# Patient Record
Sex: MALE | Race: WHITE | Employment: OTHER | URBAN - METROPOLITAN AREA
[De-identification: names, ages, dates, MRNs, and addresses within clinical notes are randomized per-mention and may not be internally consistent; named-entity substitution may affect disease eponyms.]

---

## 2017-02-17 ENCOUNTER — HOSPITAL ENCOUNTER (OUTPATIENT)
Dept: LAB | Age: 72
Discharge: HOME | End: 2017-02-17
Attending: INTERNAL MEDICINE
Payer: MEDICARE

## 2017-02-17 DIAGNOSIS — E11.9 TYPE 2 DIABETES MELLITUS WITHOUT COMPLICATION, WITHOUT LONG-TERM CURRENT USE OF INSULIN (HCC): ICD-10-CM

## 2017-02-17 LAB
ANION GAP BLD CALC-SCNC: 12 MMOL/L
BASOPHILS # BLD AUTO: 0 K/UL (ref 0–0.2)
BASOPHILS # BLD: 0 % (ref 0–2)
BUN SERPL-MCNC: 20 MG/DL (ref 8–23)
CALCIUM SERPL-MCNC: 9.4 MG/DL (ref 8.3–10.4)
CHLORIDE SERPL-SCNC: 105 MMOL/L (ref 98–107)
CO2 SERPL-SCNC: 24 MMOL/L (ref 23–32)
CREAT SERPL-MCNC: 1 MG/DL (ref 0.6–1)
DIFFERENTIAL METHOD BLD: ABNORMAL
EOSINOPHIL # BLD: 0.3 K/UL (ref 0–0.8)
EOSINOPHIL NFR BLD: 2 % (ref 0.5–7.8)
ERYTHROCYTE [DISTWIDTH] IN BLOOD BY AUTOMATED COUNT: 12.7 % (ref 11.9–14.6)
GLUCOSE SERPL-MCNC: 193 MG/DL (ref 65–100)
HCT VFR BLD AUTO: 43.9 % (ref 41.1–50.3)
HGB BLD-MCNC: 14.6 G/DL (ref 13.6–17.2)
LYMPHOCYTES # BLD AUTO: 13 % (ref 13–44)
LYMPHOCYTES # BLD: 1.7 K/UL (ref 0.5–4.6)
MCH RBC QN AUTO: 29.9 PG (ref 26.1–32.9)
MCHC RBC AUTO-ENTMCNC: 33.3 G/DL (ref 31.4–35)
MCV RBC AUTO: 90 FL (ref 79.6–97.8)
MONOCYTES # BLD: 1.4 K/UL (ref 0.1–1.3)
MONOCYTES NFR BLD AUTO: 11 % (ref 4–12)
NEUTS SEG # BLD: 9.3 K/UL (ref 1.7–8.2)
NEUTS SEG NFR BLD AUTO: 74 % (ref 43–78)
PLATELET # BLD AUTO: 212 K/UL (ref 150–450)
PMV BLD AUTO: 11 FL (ref 10.8–14.1)
POTASSIUM SERPL-SCNC: 4.2 MMOL/L (ref 3.5–5.1)
RBC # BLD AUTO: 4.88 M/UL (ref 4.23–5.67)
SODIUM SERPL-SCNC: 141 MMOL/L (ref 136–145)
WBC # BLD AUTO: 12.7 K/UL (ref 4.3–11.1)

## 2017-02-17 PROCEDURE — 36415 COLL VENOUS BLD VENIPUNCTURE: CPT | Performed by: INTERNAL MEDICINE

## 2017-02-17 PROCEDURE — 82010 KETONE BODYS QUAN: CPT | Performed by: INTERNAL MEDICINE

## 2017-02-17 PROCEDURE — 85025 COMPLETE CBC W/AUTO DIFF WBC: CPT | Performed by: INTERNAL MEDICINE

## 2017-02-17 PROCEDURE — 80048 BASIC METABOLIC PNL TOTAL CA: CPT | Performed by: INTERNAL MEDICINE

## 2017-02-17 NOTE — PROGRESS NOTES
Tell lytes were ok --the wbc was up very slightly c/w his illness-sugar was 193 hi from illness also

## 2017-02-20 LAB — B-OH-BUTYR SERPL-MCNC: 9.8 MG/DL

## 2017-02-23 ENCOUNTER — HOSPITAL ENCOUNTER (OUTPATIENT)
Dept: CT IMAGING | Age: 72
Discharge: HOME | End: 2017-02-23
Attending: INTERNAL MEDICINE
Payer: MEDICARE

## 2017-02-23 DIAGNOSIS — R10.84 GENERALIZED ABDOMINAL PAIN: ICD-10-CM

## 2017-02-23 PROCEDURE — 74011000258 HC RX REV CODE- 258: Performed by: INTERNAL MEDICINE

## 2017-02-23 PROCEDURE — 74177 CT ABD & PELVIS W/CONTRAST: CPT

## 2017-02-23 PROCEDURE — 74011636320 HC RX REV CODE- 636/320: Performed by: INTERNAL MEDICINE

## 2017-02-23 RX ORDER — SODIUM CHLORIDE 0.9 % (FLUSH) 0.9 %
10 SYRINGE (ML) INJECTION
Status: COMPLETED | OUTPATIENT
Start: 2017-02-23 | End: 2017-02-23

## 2017-02-23 RX ADMIN — SODIUM CHLORIDE 100 ML: 900 INJECTION, SOLUTION INTRAVENOUS at 12:39

## 2017-02-23 RX ADMIN — IOVERSOL 100 ML: 741 INJECTION INTRA-ARTERIAL; INTRAVENOUS at 12:39

## 2017-02-23 RX ADMIN — DIATRIZOATE MEGLUMINE AND DIATRIZOATE SODIUM 15 ML: 660; 100 LIQUID ORAL; RECTAL at 12:39

## 2017-02-23 RX ADMIN — Medication 10 ML: at 12:39

## 2017-02-23 NOTE — PROGRESS NOTES
Tell the CT looks ok in pancreas and the colon --there is some inflammation in the stomach like a gastritis--he may need to have endoscopy done for this though-he is on prilosec and would increase to bid dose for now--the labs are pending-who is his GI ?

## 2017-04-10 ENCOUNTER — HOSPITAL ENCOUNTER (OUTPATIENT)
Dept: LAB | Age: 72
Discharge: HOME | End: 2017-04-10

## 2017-04-10 PROCEDURE — 88305 TISSUE EXAM BY PATHOLOGIST: CPT | Performed by: INTERNAL MEDICINE

## 2017-04-10 PROCEDURE — 88312 SPECIAL STAINS GROUP 1: CPT | Performed by: INTERNAL MEDICINE

## 2017-05-09 PROBLEM — R39.11 DELAYED ONSET OF URINATION: Status: ACTIVE | Noted: 2017-04-25

## 2017-05-09 PROBLEM — N20.0 CALCULUS OF KIDNEY: Status: ACTIVE | Noted: 2017-01-17

## 2017-05-09 PROBLEM — R39.89 BLADDER PAIN: Status: ACTIVE | Noted: 2017-04-25

## 2017-05-09 PROBLEM — N48.29: Status: ACTIVE | Noted: 2017-01-17

## 2017-06-15 PROBLEM — R63.4 UNINTENDED WEIGHT LOSS: Status: ACTIVE | Noted: 2017-05-25

## 2017-06-15 PROBLEM — K40.90 LEFT INGUINAL HERNIA: Status: ACTIVE | Noted: 2017-05-25

## 2017-06-15 PROBLEM — R30.0 DIFFICULT OR PAINFUL URINATION: Status: ACTIVE | Noted: 2017-05-25

## 2017-06-15 PROBLEM — R10.32 LEFT LOWER QUADRANT PAIN: Status: ACTIVE | Noted: 2017-05-25

## 2017-06-15 PROBLEM — N32.0 BLADDER NECK OBSTRUCTION: Status: ACTIVE | Noted: 2017-05-25

## 2017-07-26 ENCOUNTER — HOSPITAL ENCOUNTER (OUTPATIENT)
Dept: CT IMAGING | Age: 72
Discharge: HOME | End: 2017-07-26
Attending: INTERNAL MEDICINE
Payer: MEDICARE

## 2017-07-26 VITALS — HEIGHT: 68 IN | WEIGHT: 170 LBS | BODY MASS INDEX: 25.76 KG/M2

## 2017-07-26 DIAGNOSIS — R10.84 GENERALIZED ABDOMINAL PAIN: ICD-10-CM

## 2017-07-26 DIAGNOSIS — R63.4 WEIGHT LOSS: ICD-10-CM

## 2017-07-26 DIAGNOSIS — R93.3 ABNORMAL COMPUTED TOMOGRAPHY OF GASTROINTESTINAL TRACT: ICD-10-CM

## 2017-07-26 LAB — CREAT BLD-MCNC: 0.8 MG/DL (ref 0.6–1)

## 2017-07-26 PROCEDURE — 74011636320 HC RX REV CODE- 636/320: Performed by: INTERNAL MEDICINE

## 2017-07-26 PROCEDURE — 74011000258 HC RX REV CODE- 258: Performed by: INTERNAL MEDICINE

## 2017-07-26 PROCEDURE — 74177 CT ABD & PELVIS W/CONTRAST: CPT

## 2017-07-26 PROCEDURE — 82565 ASSAY OF CREATININE: CPT

## 2017-07-26 RX ORDER — SODIUM CHLORIDE 0.9 % (FLUSH) 0.9 %
10 SYRINGE (ML) INJECTION
Status: COMPLETED | OUTPATIENT
Start: 2017-07-26 | End: 2017-07-26

## 2017-07-26 RX ADMIN — DIATRIZOATE MEGLUMINE AND DIATRIZOATE SODIUM 15 ML: 660; 100 LIQUID ORAL; RECTAL at 13:15

## 2017-07-26 RX ADMIN — Medication 10 ML: at 13:16

## 2017-07-26 RX ADMIN — SODIUM CHLORIDE 100 ML: 900 INJECTION, SOLUTION INTRAVENOUS at 13:16

## 2017-07-26 RX ADMIN — IOPAMIDOL 100 ML: 755 INJECTION, SOLUTION INTRAVENOUS at 13:15

## 2017-08-23 ENCOUNTER — HOSPITAL ENCOUNTER (OUTPATIENT)
Age: 72
Setting detail: OUTPATIENT SURGERY
Discharge: HOME | End: 2017-08-23
Attending: INTERNAL MEDICINE | Admitting: INTERNAL MEDICINE
Payer: MEDICARE

## 2017-08-23 ENCOUNTER — ANESTHESIA EVENT (OUTPATIENT)
Dept: ENDOSCOPY | Age: 72
End: 2017-08-23
Payer: MEDICARE

## 2017-08-23 ENCOUNTER — ANESTHESIA (OUTPATIENT)
Dept: ENDOSCOPY | Age: 72
End: 2017-08-23
Payer: MEDICARE

## 2017-08-23 VITALS
RESPIRATION RATE: 14 BRPM | WEIGHT: 167.99 LBS | SYSTOLIC BLOOD PRESSURE: 149 MMHG | HEIGHT: 68 IN | BODY MASS INDEX: 25.46 KG/M2 | HEART RATE: 86 BPM | DIASTOLIC BLOOD PRESSURE: 77 MMHG | TEMPERATURE: 98.6 F | OXYGEN SATURATION: 96 %

## 2017-08-23 LAB — GLUCOSE BLD STRIP.AUTO-MCNC: 109 MG/DL (ref 65–100)

## 2017-08-23 PROCEDURE — 88172 CYTP DX EVAL FNA 1ST EA SITE: CPT | Performed by: INTERNAL MEDICINE

## 2017-08-23 PROCEDURE — 76040000026: Performed by: INTERNAL MEDICINE

## 2017-08-23 PROCEDURE — 77030028690 HC NDL ASPIR ULTRSND BSC -E: Performed by: INTERNAL MEDICINE

## 2017-08-23 PROCEDURE — 88177 CYTP FNA EVAL EA ADDL: CPT | Performed by: INTERNAL MEDICINE

## 2017-08-23 PROCEDURE — 82962 GLUCOSE BLOOD TEST: CPT

## 2017-08-23 PROCEDURE — 74011250636 HC RX REV CODE- 250/636

## 2017-08-23 PROCEDURE — 74011000250 HC RX REV CODE- 250: Performed by: ANESTHESIOLOGY

## 2017-08-23 PROCEDURE — 76060000033 HC ANESTHESIA 1 TO 1.5 HR: Performed by: INTERNAL MEDICINE

## 2017-08-23 PROCEDURE — 74011000250 HC RX REV CODE- 250

## 2017-08-23 PROCEDURE — 77030008969: Performed by: INTERNAL MEDICINE

## 2017-08-23 PROCEDURE — 74011250636 HC RX REV CODE- 250/636: Performed by: ANESTHESIOLOGY

## 2017-08-23 PROCEDURE — 88173 CYTOPATH EVAL FNA REPORT: CPT | Performed by: INTERNAL MEDICINE

## 2017-08-23 PROCEDURE — 88305 TISSUE EXAM BY PATHOLOGIST: CPT | Performed by: INTERNAL MEDICINE

## 2017-08-23 RX ORDER — LIDOCAINE HYDROCHLORIDE 20 MG/ML
INJECTION, SOLUTION EPIDURAL; INFILTRATION; INTRACAUDAL; PERINEURAL AS NEEDED
Status: DISCONTINUED | OUTPATIENT
Start: 2017-08-23 | End: 2017-08-23 | Stop reason: HOSPADM

## 2017-08-23 RX ORDER — SODIUM CHLORIDE, SODIUM LACTATE, POTASSIUM CHLORIDE, CALCIUM CHLORIDE 600; 310; 30; 20 MG/100ML; MG/100ML; MG/100ML; MG/100ML
100 INJECTION, SOLUTION INTRAVENOUS CONTINUOUS
Status: CANCELLED | OUTPATIENT
Start: 2017-08-23

## 2017-08-23 RX ORDER — PROPOFOL 10 MG/ML
INJECTION, EMULSION INTRAVENOUS AS NEEDED
Status: DISCONTINUED | OUTPATIENT
Start: 2017-08-23 | End: 2017-08-23 | Stop reason: HOSPADM

## 2017-08-23 RX ORDER — SODIUM CHLORIDE, SODIUM LACTATE, POTASSIUM CHLORIDE, CALCIUM CHLORIDE 600; 310; 30; 20 MG/100ML; MG/100ML; MG/100ML; MG/100ML
100 INJECTION, SOLUTION INTRAVENOUS CONTINUOUS
Status: DISCONTINUED | OUTPATIENT
Start: 2017-08-23 | End: 2017-08-23 | Stop reason: HOSPADM

## 2017-08-23 RX ORDER — PROPOFOL 10 MG/ML
INJECTION, EMULSION INTRAVENOUS
Status: DISCONTINUED | OUTPATIENT
Start: 2017-08-23 | End: 2017-08-23 | Stop reason: HOSPADM

## 2017-08-23 RX ORDER — SODIUM CHLORIDE 0.9 % (FLUSH) 0.9 %
5-10 SYRINGE (ML) INJECTION AS NEEDED
Status: CANCELLED | OUTPATIENT
Start: 2017-08-23

## 2017-08-23 RX ADMIN — PROPOFOL 100 MG: 10 INJECTION, EMULSION INTRAVENOUS at 13:58

## 2017-08-23 RX ADMIN — FAMOTIDINE 20 MG: 10 INJECTION, SOLUTION INTRAVENOUS at 12:27

## 2017-08-23 RX ADMIN — SODIUM CHLORIDE, SODIUM LACTATE, POTASSIUM CHLORIDE, AND CALCIUM CHLORIDE: 600; 310; 30; 20 INJECTION, SOLUTION INTRAVENOUS at 13:55

## 2017-08-23 RX ADMIN — PROPOFOL 250 MCG/KG/MIN: 10 INJECTION, EMULSION INTRAVENOUS at 13:58

## 2017-08-23 RX ADMIN — LIDOCAINE HYDROCHLORIDE 60 MG: 20 INJECTION, SOLUTION EPIDURAL; INFILTRATION; INTRACAUDAL; PERINEURAL at 13:58

## 2017-08-23 NOTE — OP NOTES
Gastroenterology Procedure Note              Procedure:  Endoscopic ultrasound with Doppler and Fine needle aspiration    Date of Procedure:  8/23/2017    Patient:  Rachael Dalton     [birth year]    Indication:  Pancreatic mass    Sedation:  MAC    Pre-Procedure Physical Exam:    Mental status:  alert and oriented  Airway:  normal oropharyngeal airway and neck mobility  CV:  regular rate and rhythm  Respiratory:  clear to auscultation    Procedure:  A History and Physical has been performed, and patient medication allergies have been  reviewed. Risks of perforation, hemorrhage, adverse drug reaction, and aspiration were discussed. Informed consent was obtained for the procedure, including sedation. The patient was placed in the left lateral decubitus position. The heart rate, oxygen saturations, blood pressure, and response to care were monitored throughout the procedure. The linear echoendoscope was passed through the mouth and advanced under direct vision to the distal duodenum. As the scope was slowly withdrawn, detailed endoscopic images were obtained from the surrounding organs. FNA was performed using a transduodenal approach. The patient tolerated the procedure well. Findings:     ENDOSCOPIC FINDINGS:  Limited views of the mucosa with the echoendoscope reveals no gross abnormalities of the stomach. There is extrinsic compression of the anterior wall of the duodenal bulb. The ampulla is well-visualized endoscopically and appears normal.    PANCREAS:  The pancreas is well-visualized from head to tail. There is parenchymal of the body and tail. In the head of the pancreas, there is a 24 x 21 mm, hypoechoic, heterogenous mass. This is pentrating through the muscularis propria of the duodenal wall. The mass compresses the pancreatic duct and abuts the wall of the portal vein at the confluence.  The pancreatic duct is dilated upstream to 5 mm maximally within the neck of the pancreas and measures 2 mm within the tail of the pancreas. There is normal flow within the portal vein and superior mesenteric vein. The superior mesenteric artery and celiac artery are normal. Fine needle aspiration was performed using a 22-gauge [employer] needle. Five passes were made and sent for cytology. BILIARY TREE: The gallbladder contains a single mobile echogenic stone. The common bile duct is well-visualized from its insertion in the ampulla to the bifurcation of right and left hepatic ducts. It is non-dilated, measuring up to 7 mm maximally with a gradual taper down to the level of the ampulla. There are no intraductal stones, sludge or debris. The ampulla appears normal endosonographically. OTHER ORGANS:  Views of the left lobe of the liver demonstrate no solid mass lesions. There is no ascites in the upper abdomen. The left adrenal gland appears normal. There are no pathologically enlarged posterior mediastinal or upper abdominal lymph nodes. Specimen:  Yes    Estimated Blood Loss:  3 ml    Implant:  None           Impression:    1. Pancreatic mass. Preliminary cytology is suspicious for malignancy. This is locally advanced disease. 2. Cholelithiasis. Plan:  1. Follow up results of cytology. 2. Avoid NSAIDs for 48 hours. 3. Follow up with Dr. Akosua Agudelo as scheduled.     Signed:  Hilario Mota MD  8/23/2017  2:49 PM

## 2017-08-23 NOTE — ROUTINE PROCESS
Vss. No complaints noted. Education given and reviewed with pt who was awake and alert. Pt wheeled out via wheelchair.

## 2017-08-23 NOTE — H&P
History and Physical for Endoscopic Ultrasound             Date: 8/23/2017     History of Present Illness:  Patient presents to undergo endoscopic ultrasound for evaluation of a pancreatic mass. Patient denies any dysphagia, diarrhea, constipation, tenesmus, or GI bleeding. Past Medical History:   Diagnosis Date    Abdominal pain, unspecified site     Abnormal CXR 2006    & CT; tiny left mid lung nodule and similar nodule on the right    Acute bronchitis     Allergic rhinitis 10/15/2013    Benign skin growth of ear     Cervicalgia     Chest wall pain, chronic     right sided    Diverticular disease     Essential hypertension, benign     Fasciitis, unspecified     GERD (gastroesophageal reflux disease) 10/15/2013    Hip pain     IFG (impaired fasting glucose)     Impacted cerumen     Intrinsic asthma, unspecified 10/15/2013    Kidney stone     Lumbago     Lung nodule 10/15/2013    Stable left mid lung nodule since 2006.   Felt to represent granuloma     Mixed hyperlipidemia     Neoplasm of uncertain behavior of skin     Nephrolithiasis     Obesity 10/15/2013    Osteoarthrosis, unspecified whether generalized or localized, ankle and foot     Other ill-defined conditions 8/2011    cardiac workup; normal per patient report    Personal history of colonic polyps     Personal history of tobacco use, presenting hazards to health 10/15/2013    Prostate CA ([facility] [address]     12/2008 Radical prostatectomy    Renal cyst     Sciatica     Temporomandibular joint articular disc disorder     Type II or unspecified type diabetes mellitus without mention of complication, not stated as uncontrolled     checks QAM normal 115-130, no hyposymptoms    Unspecified asthma      Past Surgical History:   Procedure Laterality Date    HX CATARACT REMOVAL Bilateral 8/2014    w IOL    HX CATARACT REMOVAL Bilateral 04/01/2016    unsure of exact date    HX COLONOSCOPY      HX FRACTURE TX      right ankle and leg    HX GI      HX HERNIA REPAIR      HX RADICAL PROSTATECTOMY  12/2008    HX UROLOGICAL      bladder stricture       Family History   Problem Relation Age of Onset    Cancer Father      lung    Stroke Mother     Diabetes Mother      Type 2    Diabetes Other     Stroke Other      Social History   Substance Use Topics    Smoking status: Former Smoker     Packs/day: 1.00     Years: 40.00     Quit date: 1/1/1992    Smokeless tobacco: Never Used    Alcohol use 1.8 oz/week     2 Glasses of wine, 1 Cans of beer, 0 Shots of liquor per week        Allergies   Allergen Reactions    Latex, Natural Rubber Rash    Jardiance [Empagliflozin] Other (comments)     Causes stomach and bladder issues    Pcn [Penicillins] Rash    Sulfa (Sulfonamide Antibiotics) Rash     Current Facility-Administered Medications   Medication Dose Route Frequency    lactated Ringers infusion  100 mL/hr IntraVENous CONTINUOUS        Review of Systems:  A detailed 10 organ review of systems is obtained with pertinent positives as listed in the History of Present Illness. All others are negative. Objective:     Physical Exam:  Visit Vitals    /75    Pulse 68    Temp 98.2 °F (36.8 °C)    Resp 16    Ht 5' 8\" (1.727 m)    Wt 76.2 kg (167 lb 15.9 oz)    SpO2 96%    BMI 25.54 kg/m2      General:  Alert and oriented. Heart: Regular rate and rhythm  Lungs:  Clear to auscultation bilaterally  Abdomen: Soft, nontender, nondistended    Impression/Plan:     Proceed with EUS as planned. I have discussed with the patient the technique, benefits, alternatives, and risks of the procedure, including medication reaction, immediate or delayed bleeding, or perforation of the gastrointestinal tract.     Signed By: Del Varghese MD     August 23, 2017

## 2017-08-23 NOTE — ANESTHESIA POSTPROCEDURE EVALUATION
Post-Anesthesia Evaluation and Assessment    Patient: Charity Lancaster MRN: [id number]  SSN: [id number]    YOB: 1945  Age: 71 y.o. Sex: male       Cardiovascular Function/Vital Signs  Visit Vitals    /68    Pulse 77    Temp 37 °C (98.6 °F)    Resp 14    Ht 5' 8\" (1.727 m)    Wt 76.2 kg (167 lb 15.9 oz)    SpO2 97%    BMI 25.54 kg/m2       Patient is status post total IV anesthesia anesthesia for Procedure(s):  ENDOSCOPIC ULTRASOUND (EUS) / BMI=26  ENDOSCOPIC ULTRASOUND ASPIRATION FINE NEEDLE. Nausea/Vomiting: None    Postoperative hydration reviewed and adequate. Pain:  Pain Scale 1: Visual (08/23/17 1456)  Pain Intensity 1: 0 (08/23/17 1456)   Managed    Neurological Status: At baseline    Mental Status and Level of Consciousness: Arousable    Pulmonary Status:   O2 Device: Nasal cannula (08/23/17 1456)   Adequate oxygenation and airway patent    Complications related to anesthesia: None    Post-anesthesia assessment completed.  No concerns    Signed By: Moises Fleming MD     August 23, 2017

## 2017-08-23 NOTE — IP AVS SNAPSHOT
[address] 
[phone] Patient: Judith Clifford MRN: [id number] [facility]:[birth year] You are allergic to the following Allergen Reactions Latex, Natural Rubber Rash  
    
 Jardiance (Empagliflozin) Other (comments) Causes stomach and bladder issues Pcn (Penicillins) Rash  
    
 Sulfa (Sulfonamide Antibiotics) Rash Recent Documentation Height Weight BMI Smoking Status 1.727 m 76.2 kg 25.54 kg/m2 Former Smoker Emergency Contacts Name Discharge Info Relation Home Work Mobile Adithyafreda Guerra  Child [2] [phone] About your hospitalization You were admitted on:  August 23, 2017 You last received care in the:  [facility] You were discharged on:  August 23, 2017 Unit phone number:  [phone] Why you were hospitalized Your primary diagnosis was:  Not on File Providers Seen During Your Hospitalizations Provider Role Specialty Primary office phone Luis Antonio Villegas MD Attending Provider Gastroenterology [phone] Your Primary Care Physician (PCP) Primary Care Physician Office Phone Office Fax Larisa Benson [phone] Follow-up Information None Your Appointments Tuesday September 19, 2017  8:20 AM EDT Follow Up with Hao Amador MD  
[facility]) [address] [place] [address] [place] [id number] [phone] Current Discharge Medication List  
  
ASK your doctor about these medications Dose & Instructions Dispensing Information Comments Morning Noon Evening Bedtime ADVAIR DISKUS 250-50 mcg/dose diskus inhaler Generic drug:  fluticasone-salmeterol Your last dose was: Your next dose is:    
   
   
 USE 1 INHALATION TWICE A DAY. RINSE MOUTH AFTER USE. Quantity:  180 Each Refills:  2  
     
   
   
   
  
 albuterol 90 mcg/actuation inhaler Commonly known as:  PROAIR HFA Your last dose was: Your next dose is:    
   
   
 2 puffs qid prn Quantity:  3 Inhaler Refills:  3  
     
   
   
   
  
 amLODIPine-benazepril 5-40 mg per capsule Commonly known as:  Veronique Mcconnell Your last dose was: Your next dose is:    
   
   
 Dose:  1 Cap Take 1 Cap by mouth daily. Quantity:  90 Cap Refills:  3  
     
   
   
   
  
 COQ10  100-100 mg-unit Cap Generic drug:  coenzyme q10-vitamin e Your last dose was: Your next dose is:    
   
   
 Dose:  1 Cap Take 1 Cap by mouth daily. Refills:  0 COREG 12.5 mg tablet Generic drug:  carvedilol Your last dose was: Your next dose is:    
   
   
 Dose:  6.25 mg Take 6.25 mg by mouth two (2) times daily (with meals). Refills:  0  
     
   
   
   
  
 docusate sodium 100 mg capsule Commonly known as:  Rafi Mars Your last dose was: Your next dose is:    
   
   
 Dose:  100 mg Take 100 mg by mouth daily. Refills:  0  
     
   
   
   
  
 doxazosin 4 mg tablet Commonly known as:  CARDURA Your last dose was: Your next dose is: TAKE 1 TABLET DAILY Quantity:  90 Tab Refills:  3 FISH OIL 1,000 mg Cap Generic drug:  omega-3 fatty acids-vitamin e Your last dose was: Your next dose is:    
   
   
 Dose:  1 Cap Take 1 Cap by mouth. Refills:  0  
     
   
   
   
  
 fluticasone 50 mcg/actuation nasal spray Commonly known as:  Harish Celsa Your last dose was: Your next dose is:    
   
   
 USE 2 SPRAYS IN EACH NOSTRIL DAILY Quantity:  48 g Refills:  3 FREESTYLE LANCETS 28 gauge Misc Generic drug:  lancets Your last dose was: Your next dose is: Once a day (E11.65) Quantity:  100 Lancet Refills:  3 FREESTYLE LITE STRIPS strip Generic drug:  glucose blood VI test strips Your last dose was: Your next dose is: Once a day (E11.65) Quantity:  100 Strip Refills:  3  
     
   
   
   
  
 hyoscyamine SL 0.125 mg SL tablet Commonly known as:  LEVSIN/SL Your last dose was: Your next dose is:    
   
   
 Dose:  0.125 mg Take 0.125 mg by mouth Before breakfast, lunch, and dinner. Refills:  0  
     
   
   
   
  
 imipramine 50 mg tablet Commonly known as:  TOFRANIL Your last dose was: Your next dose is:    
   
   
 Dose:  50 mg Take 50 mg by mouth two (2) times a day. Refills:  0  
     
   
   
   
  
 metFORMIN  mg tablet Commonly known as:  GLUCOPHAGE XR Your last dose was: Your next dose is:    
   
   
 Dose:  1000 mg Take 2 Tabs by mouth two (2) times daily (with meals). Quantity:  360 Tab Refills:  3  
     
   
   
   
  
 montelukast 10 mg tablet Commonly known as:  SINGULAIR Your last dose was: Your next dose is: TAKE 1 TABLET EVERY EVENING Quantity:  90 Tab Refills:  2  
     
   
   
   
  
 multivitamin tablet Commonly known as:  ONE A DAY Your last dose was: Your next dose is:    
   
   
 Dose:  1 Tab Take 1 Tab by mouth daily. Refills:  0 Zofia Pen Needle 32 gauge x 5/32\" Ndle Generic drug:  Insulin Needles (Disposable) Your last dose was: Your next dose is: Once a day (E11.65) Quantity:  100 Pen Needle Refills:  3 OCUVITE PO Your last dose was: Your next dose is:    
   
   
 Dose:  1 Cap Take 1 Cap by mouth daily. Refills:  0  
     
   
   
   
  
 omeprazole 20 mg capsule Commonly known as:  PRILOSEC Your last dose was:     
   
Your next dose is:    
   
   
 Dose:  20 mg  
 Take 20 mg by mouth two (2) times a day. Refills:  0 PROBIOTIC ACIDOPHILUS PO Your last dose was: Your next dose is: Take  by mouth. Daily Refills:  0  
     
   
   
   
  
 simvastatin 20 mg tablet Commonly known as:  ZOCOR Your last dose was: Your next dose is:    
   
   
 Dose:  20 mg Take 1 Tab by mouth nightly. Quantity:  90 Tab Refills:  3  
     
   
   
   
  
 traMADol-acetaminophen 37.5-325 mg per tablet Commonly known as:  ULTRACET Your last dose was: Your next dose is:    
   
   
 Dose:  1 Tab Take 1 Tab by mouth every four (4) hours as needed for Pain. Max Daily Amount: 6 Tabs. Quantity:  30 Tab Refills:  2 TRESIBA FLEXTOUCH U-100 100 unit/mL (3 mL) Inpn Generic drug:  insulin degludec Your last dose was: Your next dose is:    
   
   
 Dose:  16 Units 16 Units by SubCUTAneous route daily. Quantity:  5 Pen Refills:  3 Discharge Instructions Gastrointestinal Esophagogastroduodenoscopy (EGD) - Upper Exam Discharge Instructions 1. Call Dr. Regis Lynn for any problems or questions. 2. Contact the doctor's office for follow up appointment as directed. 3. Medication may cause drowsiness for several hours, therefore, do not drive or operate machinery for remainder of the day. 4. No alcohol today. 5. Ordinarily, you may resume regular diet and activity after exam unless otherwise specified by your physician. 6. For mild soreness in your throat you may use Cepacol throat lozenges or warm salt-water gargles as needed. Any additional instructions: Follow up pathology; avoid NSAIDs (advil, ibuprofen, aspirin, aleve) for 2 days Instructions given to Chanel Franky and other family members. Discharge Orders None ACO Transitions of Care Introducing [employer] [address] Betty[address] Coyle offers a voluntary care coordination program to provide high quality service and care to [facility] fee-for-service beneficiaries. Diane Rosado was designed to help you enhance your health and well-being through the following services: ? Transitions of Care  support for individuals who are transitioning from one care setting to another (example: Hospital to home). ? Chronic and Complex Care Coordination  support for individuals and caregivers of those with serious or chronic illnesses or with more than one chronic (ongoing) condition and those who take a number of different medications. If you meet specific medical criteria, a [address] [place] [address] Rd may call you directly to coordinate your care with your primary care physician and your other care providers. For questions about the [facility] programs, please, contact your physicians office. For general questions or additional information about Accountable Care Organizations: 
Please visit www.medicare.gov/acos. html or call 1-800-MEDICARE (0-266.388.6558) TTY users should call 2-607.617.7346. Introducing [facility]! Dear Carisa Elizondo: Thank you for requesting a [employer] account. Our records indicate that you already have an active [employer] account. You can access your account anytime at [email or web address] Did you know that you can access your hospital and ER discharge instructions at any time in [employer]? You can also review all of your test results from your hospital stay or ER visit. Additional Information If you have questions, please visit the Frequently Asked Questions section of the [employer] website at [email or web address] Remember, [employer] is NOT to be used for urgent needs. For medical emergencies, dial 911. Now available from your iPhone and Android! General Information Please provide this summary of care documentation to your next provider. Patient Signature:  ____________________________________________________________ Date:  ____________________________________________________________  
  
Danae Faes Provider Signature:  ____________________________________________________________ Date:  ____________________________________________________________

## 2017-08-23 NOTE — ANESTHESIA PREPROCEDURE EVALUATION
Anesthetic History   No history of anesthetic complications            Review of Systems / Medical History  Patient summary reviewed and pertinent labs reviewed    Pulmonary            Asthma : well controlled       Neuro/Psych   Within defined limits           Cardiovascular    Hypertension: well controlled          Hyperlipidemia    Exercise tolerance: >4 METS     GI/Hepatic/Renal     GERD: well controlled           Endo/Other    Diabetes: well controlled, type 2    Arthritis     Other Findings              Physical Exam    Airway  Mallampati: II  TM Distance: > 6 cm  Neck ROM: normal range of motion   Mouth opening: Normal     Cardiovascular    Rhythm: regular  Rate: normal         Dental    Dentition: Lower partial plate     Pulmonary  Breath sounds clear to auscultation               Abdominal         Other Findings            Anesthetic Plan    ASA: 2  Anesthesia type: total IV anesthesia            Anesthetic plan and risks discussed with: Patient

## 2017-08-23 NOTE — DISCHARGE INSTRUCTIONS
Gastrointestinal Esophagogastroduodenoscopy (EGD) - Upper Exam Discharge Instructions    1. Call Dr. Mirta Salinas for any problems or questions. 2. Contact the doctor's office for follow up appointment as directed. 3. Medication may cause drowsiness for several hours, therefore, do not drive or operate machinery for remainder of the day. 4. No alcohol today. 5. Ordinarily, you may resume regular diet and activity after exam unless otherwise specified by your physician. 6. For mild soreness in your throat you may use Cepacol throat lozenges or warm salt-water gargles as needed. Any additional instructions: Follow up pathology; avoid NSAIDs (advil, ibuprofen, aspirin, aleve) for 2 days    Instructions given to Erick Whitehead and other family members.

## 2017-09-19 PROBLEM — E11.42 CONTROLLED TYPE 2 DIABETES MELLITUS WITH DIABETIC POLYNEUROPATHY, WITH LONG-TERM CURRENT USE OF INSULIN (HCC): Status: ACTIVE | Noted: 2017-09-19

## 2017-09-19 PROBLEM — Z79.4 CONTROLLED TYPE 2 DIABETES MELLITUS WITH DIABETIC POLYNEUROPATHY, WITH LONG-TERM CURRENT USE OF INSULIN (HCC): Status: ACTIVE | Noted: 2017-09-19

## 2017-12-18 PROBLEM — Z01.810 PREOP CARDIOVASCULAR EXAM: Status: ACTIVE | Noted: 2017-08-07

## 2017-12-18 PROBLEM — K92.0 HEMATEMESIS WITH NAUSEA: Status: RESOLVED | Noted: 2017-10-23 | Resolved: 2017-12-18

## 2017-12-18 PROBLEM — I51.9 LEFT VENTRICULAR SYSTOLIC DYSFUNCTION: Status: ACTIVE | Noted: 2017-08-14

## 2017-12-18 PROBLEM — R11.2 CHEMOTHERAPY INDUCED NAUSEA AND VOMITING: Status: ACTIVE | Noted: 2017-10-17

## 2017-12-18 PROBLEM — K59.01 SLOW TRANSIT CONSTIPATION: Status: ACTIVE | Noted: 2017-11-20

## 2017-12-18 PROBLEM — B37.2 CANDIDAL INTERTRIGO: Status: ACTIVE | Noted: 2017-11-20

## 2017-12-18 PROBLEM — T45.1X5A CHEMOTHERAPY INDUCED NAUSEA AND VOMITING: Status: ACTIVE | Noted: 2017-10-17

## 2017-12-18 PROBLEM — K92.0 HEMATEMESIS WITH NAUSEA: Status: ACTIVE | Noted: 2017-10-23

## 2017-12-18 PROBLEM — E78.00 HYPERCHOLESTEROLEMIA: Status: ACTIVE | Noted: 2017-08-07

## 2017-12-18 PROBLEM — C25.0 MALIGNANT NEOPLASM OF HEAD OF PANCREAS (HCC): Status: ACTIVE | Noted: 2017-05-25

## 2017-12-18 PROBLEM — Z87.19 HISTORY OF BILATERAL INGUINAL HERNIA REPAIR: Status: ACTIVE | Noted: 2017-11-20

## 2017-12-18 PROBLEM — I44.7 LBBB (LEFT BUNDLE BRANCH BLOCK): Status: ACTIVE | Noted: 2017-08-04

## 2017-12-18 PROBLEM — Z98.890 HISTORY OF BILATERAL INGUINAL HERNIA REPAIR: Status: ACTIVE | Noted: 2017-11-20

## 2018-07-03 PROBLEM — K62.0 ANORECTAL POLYP: Status: ACTIVE | Noted: 2018-01-01

## 2018-07-03 PROBLEM — K86.89 PANCREATIC MASS: Status: ACTIVE | Noted: 2018-01-01

## 2018-07-03 PROBLEM — R82.81 PYURIA: Status: RESOLVED | Noted: 2017-12-28 | Resolved: 2018-01-01

## 2018-07-03 PROBLEM — K29.00 ACUTE GASTRITIS WITHOUT BLEEDING: Status: ACTIVE | Noted: 2018-01-01

## 2018-07-03 PROBLEM — K40.90 INGUINAL HERNIA WITHOUT OBSTRUCTION OR GANGRENE: Status: ACTIVE | Noted: 2018-01-01

## 2018-07-03 PROBLEM — Z12.11 SPECIAL SCREENING FOR MALIGNANT NEOPLASMS, COLON: Status: ACTIVE | Noted: 2018-01-01

## 2018-07-03 PROBLEM — Z74.09 IMPAIRED MOBILITY AND ADLS: Status: ACTIVE | Noted: 2018-01-01

## 2018-07-03 PROBLEM — R10.32 LEFT LOWER QUADRANT PAIN: Status: RESOLVED | Noted: 2017-05-25 | Resolved: 2018-01-01

## 2018-07-03 PROBLEM — R10.84 GENERALIZED ABDOMINAL PAIN: Status: RESOLVED | Noted: 2018-01-01 | Resolved: 2018-01-01

## 2018-07-03 PROBLEM — R63.4 UNINTENDED WEIGHT LOSS: Status: RESOLVED | Noted: 2017-05-25 | Resolved: 2018-01-01

## 2018-07-03 PROBLEM — R11.0 NAUSEA: Status: RESOLVED | Noted: 2018-01-01 | Resolved: 2018-01-01

## 2018-07-03 PROBLEM — K29.00 ACUTE SUPERFICIAL GASTRITIS WITHOUT HEMORRHAGE: Status: ACTIVE | Noted: 2018-01-01

## 2018-07-03 PROBLEM — R82.81 PYURIA: Status: ACTIVE | Noted: 2017-12-28

## 2018-07-03 PROBLEM — E83.42 HYPOMAGNESEMIA: Status: ACTIVE | Noted: 2018-01-01

## 2018-07-03 PROBLEM — D12.6 BENIGN NEOPLASM OF COLON: Status: ACTIVE | Noted: 2018-01-01

## 2018-07-03 PROBLEM — R93.3 ABNORMAL CT SCAN, GASTROINTESTINAL TRACT: Status: ACTIVE | Noted: 2018-01-01

## 2018-07-03 PROBLEM — Z78.9 IMPAIRED MOBILITY AND ADLS: Status: ACTIVE | Noted: 2018-01-01

## 2018-07-03 PROBLEM — K31.7 GASTRIC POLYP: Status: ACTIVE | Noted: 2018-01-01

## 2018-07-03 PROBLEM — R10.13 EPIGASTRIC PAIN: Status: ACTIVE | Noted: 2018-01-01

## 2018-07-03 PROBLEM — R10.84 GENERALIZED ABDOMINAL PAIN: Status: ACTIVE | Noted: 2018-01-01

## 2018-07-03 PROBLEM — K44.9 DIAPHRAGMATIC HERNIA WITHOUT OBSTRUCTION OR GANGRENE: Status: ACTIVE | Noted: 2018-01-01

## 2018-07-03 PROBLEM — K62.1 ANORECTAL POLYP: Status: ACTIVE | Noted: 2018-01-01

## 2018-07-03 PROBLEM — K57.90 DIVERTICULOSIS OF INTESTINE WITHOUT BLEEDING: Status: ACTIVE | Noted: 2018-01-01

## 2018-07-03 PROBLEM — N32.0 BLADDER NECK CONTRACTURE: Status: ACTIVE | Noted: 2017-05-25

## 2018-07-03 PROBLEM — K86.89 PANCREATIC MASS: Status: RESOLVED | Noted: 2018-01-01 | Resolved: 2018-01-01

## 2018-07-03 PROBLEM — R12 HEARTBURN: Status: ACTIVE | Noted: 2018-01-01

## 2018-07-03 PROBLEM — R63.4 WEIGHT LOSS: Status: ACTIVE | Noted: 2018-01-01

## 2018-07-03 PROBLEM — I31.39 PERICARDIAL EFFUSION: Status: ACTIVE | Noted: 2018-01-01

## 2018-07-03 PROBLEM — D12.5 BENIGN NEOPLASM OF SIGMOID COLON: Status: ACTIVE | Noted: 2018-01-01

## 2018-07-03 PROBLEM — R93.3 ABNORMAL CT SCAN, GASTROINTESTINAL TRACT: Status: RESOLVED | Noted: 2018-01-01 | Resolved: 2018-01-01

## 2018-07-03 PROBLEM — K22.10 ULCERATIVE ESOPHAGITIS: Status: ACTIVE | Noted: 2018-01-01

## 2018-07-03 PROBLEM — R97.8 ELEVATED CA 19-9 LEVEL: Status: ACTIVE | Noted: 2018-01-01

## 2018-07-03 PROBLEM — R11.0 NAUSEA: Status: ACTIVE | Noted: 2018-01-01

## 2018-07-03 PROBLEM — K57.30 DIVERTICULOSIS OF LARGE INTESTINE WITHOUT PERFORATION OR ABSCESS WITHOUT BLEEDING: Status: ACTIVE | Noted: 2018-01-01

## 2019-01-01 ENCOUNTER — HOSPITAL ENCOUNTER (INPATIENT)
Age: 74
LOS: 6 days | End: 2019-04-03
Attending: INTERNAL MEDICINE | Admitting: INTERNAL MEDICINE

## 2019-01-01 ENCOUNTER — HOSPITAL ENCOUNTER (OUTPATIENT)
Dept: GENERAL RADIOLOGY | Age: 74
Discharge: HOME | End: 2019-01-10
Payer: MEDICARE

## 2019-01-01 ENCOUNTER — HOSPICE ADMISSION (OUTPATIENT)
Dept: HOSPICE | Facility: HOSPICE | Age: 74
End: 2019-01-01
Payer: MEDICARE

## 2019-01-01 VITALS
HEART RATE: 117 BPM | SYSTOLIC BLOOD PRESSURE: 93 MMHG | RESPIRATION RATE: 16 BRPM | DIASTOLIC BLOOD PRESSURE: 53 MMHG | TEMPERATURE: 99.5 F

## 2019-01-01 DIAGNOSIS — N32.0 BLADDER NECK OBSTRUCTION: ICD-10-CM

## 2019-01-01 DIAGNOSIS — C78.7 HEPATIC METASTASIS (HCC): ICD-10-CM

## 2019-01-01 DIAGNOSIS — J45.909 ASTHMA, UNSPECIFIED ASTHMA SEVERITY, UNSPECIFIED WHETHER COMPLICATED, UNSPECIFIED WHETHER PERSISTENT: ICD-10-CM

## 2019-01-01 DIAGNOSIS — C25.0 MALIGNANT NEOPLASM OF HEAD OF PANCREAS (HCC): ICD-10-CM

## 2019-01-01 DIAGNOSIS — K21.00 REFLUX ESOPHAGITIS: ICD-10-CM

## 2019-01-01 DIAGNOSIS — C25.9 PANCREATIC ADENOCARCINOMA (HCC): ICD-10-CM

## 2019-01-01 PROCEDURE — 74011000250 HC RX REV CODE- 250: Performed by: NURSE PRACTITIONER

## 2019-01-01 PROCEDURE — 74011000250 HC RX REV CODE- 250: Performed by: INTERNAL MEDICINE

## 2019-01-01 PROCEDURE — 74011250637 HC RX REV CODE- 250/637: Performed by: NURSE PRACTITIONER

## 2019-01-01 PROCEDURE — 74011250636 HC RX REV CODE- 250/636: Performed by: NURSE PRACTITIONER

## 2019-01-01 PROCEDURE — 0656 HSPC GENERAL INPATIENT

## 2019-01-01 PROCEDURE — 71046 X-RAY EXAM CHEST 2 VIEWS: CPT

## 2019-01-01 PROCEDURE — 99232 SBSQ HOSP IP/OBS MODERATE 35: CPT | Performed by: INTERNAL MEDICINE

## 2019-01-01 PROCEDURE — 3336500001 HSPC ELECTION

## 2019-01-01 RX ORDER — HEPARIN 100 UNIT/ML
300 SYRINGE INTRAVENOUS AS NEEDED
Status: DISCONTINUED | OUTPATIENT
Start: 2019-01-01 | End: 2019-01-01 | Stop reason: HOSPADM

## 2019-01-01 RX ORDER — HALOPERIDOL 1 MG/1
1 TABLET ORAL
COMMUNITY

## 2019-01-01 RX ORDER — HALOPERIDOL 5 MG/ML
2 INJECTION INTRAMUSCULAR
Status: DISCONTINUED | OUTPATIENT
Start: 2019-01-01 | End: 2019-01-01 | Stop reason: HOSPADM

## 2019-01-01 RX ORDER — LORAZEPAM 2 MG/ML
1 INJECTION INTRAMUSCULAR
Status: DISCONTINUED | OUTPATIENT
Start: 2019-01-01 | End: 2019-01-01

## 2019-01-01 RX ORDER — TRIAMTERENE/HYDROCHLOROTHIAZID 37.5-25 MG
1 TABLET ORAL DAILY
COMMUNITY
Start: 2019-01-01

## 2019-01-01 RX ORDER — SODIUM CHLORIDE 0.9 % (FLUSH) 0.9 %
10 SYRINGE (ML) INJECTION EVERY 12 HOURS
Status: DISCONTINUED | OUTPATIENT
Start: 2019-01-01 | End: 2019-01-01 | Stop reason: HOSPADM

## 2019-01-01 RX ORDER — HYDROMORPHONE HYDROCHLORIDE 1 MG/ML
0.5 INJECTION, SOLUTION INTRAMUSCULAR; INTRAVENOUS; SUBCUTANEOUS
Status: DISCONTINUED | OUTPATIENT
Start: 2019-01-01 | End: 2019-01-01

## 2019-01-01 RX ORDER — FENTANYL 25 UG/1
1 PATCH TRANSDERMAL
Status: DISCONTINUED | OUTPATIENT
Start: 2019-01-01 | End: 2019-01-01

## 2019-01-01 RX ORDER — LIDOCAINE HYDROCHLORIDE 20 MG/ML
2.5 SOLUTION OROPHARYNGEAL
Status: DISCONTINUED | OUTPATIENT
Start: 2019-01-01 | End: 2019-01-01 | Stop reason: HOSPADM

## 2019-01-01 RX ORDER — HALOPERIDOL 5 MG/ML
2 INJECTION INTRAMUSCULAR EVERY 6 HOURS
Status: DISCONTINUED | OUTPATIENT
Start: 2019-01-01 | End: 2019-01-01 | Stop reason: HOSPADM

## 2019-01-01 RX ORDER — HYDROMORPHONE HYDROCHLORIDE 1 MG/ML
1 INJECTION, SOLUTION INTRAMUSCULAR; INTRAVENOUS; SUBCUTANEOUS
Status: DISCONTINUED | OUTPATIENT
Start: 2019-01-01 | End: 2019-01-01

## 2019-01-01 RX ORDER — SODIUM CHLORIDE 0.9 % (FLUSH) 0.9 %
10 SYRINGE (ML) INJECTION AS NEEDED
Status: DISCONTINUED | OUTPATIENT
Start: 2019-01-01 | End: 2019-01-01 | Stop reason: HOSPADM

## 2019-01-01 RX ORDER — DEXAMETHASONE 4 MG/1
4 TABLET ORAL DAILY
COMMUNITY
Start: 2019-01-01

## 2019-01-01 RX ORDER — FENTANYL 50 UG/1
1 PATCH TRANSDERMAL
Status: DISCONTINUED | OUTPATIENT
Start: 2019-01-01 | End: 2019-01-01 | Stop reason: HOSPADM

## 2019-01-01 RX ORDER — ACETAMINOPHEN 650 MG/1
650 SUPPOSITORY RECTAL
Status: DISCONTINUED | OUTPATIENT
Start: 2019-01-01 | End: 2019-01-01 | Stop reason: HOSPADM

## 2019-01-01 RX ORDER — HYDROMORPHONE HYDROCHLORIDE 2 MG/ML
2 INJECTION, SOLUTION INTRAMUSCULAR; INTRAVENOUS; SUBCUTANEOUS
Status: DISCONTINUED | OUTPATIENT
Start: 2019-01-01 | End: 2019-01-01 | Stop reason: HOSPADM

## 2019-01-01 RX ORDER — GRANISETRON 3.1 MG/24H
1 PATCH TRANSDERMAL
COMMUNITY
Start: 2019-01-01

## 2019-01-01 RX ORDER — HEPARIN 100 UNIT/ML
300 SYRINGE INTRAVENOUS EVERY 12 HOURS
Status: DISCONTINUED | OUTPATIENT
Start: 2019-01-01 | End: 2019-01-01 | Stop reason: HOSPADM

## 2019-01-01 RX ORDER — HALOPERIDOL 5 MG/ML
2 INJECTION INTRAMUSCULAR EVERY 8 HOURS
Status: DISCONTINUED | OUTPATIENT
Start: 2019-01-01 | End: 2019-01-01

## 2019-01-01 RX ORDER — GLYCOPYRROLATE 0.2 MG/ML
0.2 INJECTION INTRAMUSCULAR; INTRAVENOUS
Status: DISCONTINUED | OUTPATIENT
Start: 2019-01-01 | End: 2019-01-01 | Stop reason: HOSPADM

## 2019-01-01 RX ORDER — LORAZEPAM 2 MG/ML
1 INJECTION INTRAMUSCULAR
Status: DISCONTINUED | OUTPATIENT
Start: 2019-01-01 | End: 2019-01-01 | Stop reason: HOSPADM

## 2019-01-01 RX ORDER — GLYCOPYRROLATE 0.2 MG/ML
0.2 INJECTION INTRAMUSCULAR; INTRAVENOUS
Status: DISCONTINUED | OUTPATIENT
Start: 2019-01-01 | End: 2019-01-01

## 2019-01-01 RX ADMIN — Medication 10 ML: at 21:36

## 2019-01-01 RX ADMIN — SODIUM CHLORIDE, PRESERVATIVE FREE 10 ML: 5 INJECTION INTRAVENOUS at 00:27

## 2019-01-01 RX ADMIN — HALOPERIDOL LACTATE 2 MG: 5 INJECTION INTRAMUSCULAR at 14:15

## 2019-01-01 RX ADMIN — SODIUM CHLORIDE, PRESERVATIVE FREE 10 ML: 5 INJECTION INTRAVENOUS at 11:26

## 2019-01-01 RX ADMIN — HYDROMORPHONE HYDROCHLORIDE 1 MG: 1 INJECTION, SOLUTION INTRAMUSCULAR; INTRAVENOUS; SUBCUTANEOUS at 08:24

## 2019-01-01 RX ADMIN — HALOPERIDOL LACTATE 2 MG: 5 INJECTION INTRAMUSCULAR at 06:00

## 2019-01-01 RX ADMIN — SODIUM CHLORIDE, PRESERVATIVE FREE 10 ML: 5 INJECTION INTRAVENOUS at 04:19

## 2019-01-01 RX ADMIN — LIDOCAINE HYDROCHLORIDE 2.5 ML: 20 SOLUTION ORAL; TOPICAL at 00:06

## 2019-01-01 RX ADMIN — SODIUM CHLORIDE, PRESERVATIVE FREE 10 ML: 5 INJECTION INTRAVENOUS at 12:21

## 2019-01-01 RX ADMIN — SODIUM CHLORIDE, PRESERVATIVE FREE 10 ML: 5 INJECTION INTRAVENOUS at 07:44

## 2019-01-01 RX ADMIN — SODIUM CHLORIDE, PRESERVATIVE FREE 10 ML: 5 INJECTION INTRAVENOUS at 05:37

## 2019-01-01 RX ADMIN — HYDROMORPHONE HYDROCHLORIDE 0.5 MG: 1 INJECTION, SOLUTION INTRAMUSCULAR; INTRAVENOUS; SUBCUTANEOUS at 12:20

## 2019-01-01 RX ADMIN — Medication 300 UNITS: at 21:44

## 2019-01-01 RX ADMIN — SODIUM CHLORIDE, PRESERVATIVE FREE 10 ML: 5 INJECTION INTRAVENOUS at 17:22

## 2019-01-01 RX ADMIN — HYDROMORPHONE HYDROCHLORIDE 2 MG: 2 INJECTION, SOLUTION INTRAMUSCULAR; INTRAVENOUS; SUBCUTANEOUS at 12:36

## 2019-01-01 RX ADMIN — HALOPERIDOL LACTATE 2 MG: 5 INJECTION INTRAMUSCULAR at 05:52

## 2019-01-01 RX ADMIN — LORAZEPAM 1 MG: 2 INJECTION INTRAMUSCULAR; INTRAVENOUS at 00:27

## 2019-01-01 RX ADMIN — SODIUM CHLORIDE, PRESERVATIVE FREE 10 ML: 5 INJECTION INTRAVENOUS at 05:20

## 2019-01-01 RX ADMIN — LORAZEPAM 1 MG: 2 INJECTION INTRAMUSCULAR; INTRAVENOUS at 11:26

## 2019-01-01 RX ADMIN — HALOPERIDOL LACTATE 2 MG: 5 INJECTION INTRAMUSCULAR at 21:36

## 2019-01-01 RX ADMIN — HALOPERIDOL LACTATE 2 MG: 5 INJECTION INTRAMUSCULAR at 12:21

## 2019-01-01 RX ADMIN — SODIUM CHLORIDE, PRESERVATIVE FREE 10 ML: 5 INJECTION INTRAVENOUS at 11:41

## 2019-01-01 RX ADMIN — HALOPERIDOL LACTATE 2 MG: 5 INJECTION INTRAMUSCULAR at 11:41

## 2019-01-01 RX ADMIN — HYDROMORPHONE HYDROCHLORIDE 0.5 MG: 1 INJECTION, SOLUTION INTRAMUSCULAR; INTRAVENOUS; SUBCUTANEOUS at 12:09

## 2019-01-01 RX ADMIN — HALOPERIDOL LACTATE 2 MG: 5 INJECTION INTRAMUSCULAR at 09:41

## 2019-01-01 RX ADMIN — Medication 10 ML: at 22:00

## 2019-01-01 RX ADMIN — LIDOCAINE HYDROCHLORIDE 2.5 ML: 20 SOLUTION ORAL; TOPICAL at 06:21

## 2019-01-01 RX ADMIN — HYDROMORPHONE HYDROCHLORIDE 0.5 MG: 1 INJECTION, SOLUTION INTRAMUSCULAR; INTRAVENOUS; SUBCUTANEOUS at 05:19

## 2019-01-01 RX ADMIN — SODIUM CHLORIDE, PRESERVATIVE FREE 10 ML: 5 INJECTION INTRAVENOUS at 17:50

## 2019-01-01 RX ADMIN — HYDROMORPHONE HYDROCHLORIDE 1 MG: 1 INJECTION, SOLUTION INTRAMUSCULAR; INTRAVENOUS; SUBCUTANEOUS at 17:12

## 2019-01-01 RX ADMIN — HYDROMORPHONE HYDROCHLORIDE 1 MG: 1 INJECTION, SOLUTION INTRAMUSCULAR; INTRAVENOUS; SUBCUTANEOUS at 02:54

## 2019-01-01 RX ADMIN — HALOPERIDOL LACTATE 2 MG: 5 INJECTION INTRAMUSCULAR at 17:20

## 2019-01-01 RX ADMIN — Medication 10 ML: at 08:54

## 2019-01-01 RX ADMIN — SODIUM CHLORIDE, PRESERVATIVE FREE 10 ML: 5 INJECTION INTRAVENOUS at 15:36

## 2019-01-01 RX ADMIN — LIDOCAINE HYDROCHLORIDE 2.5 ML: 20 SOLUTION ORAL; TOPICAL at 14:16

## 2019-01-01 RX ADMIN — HYDROMORPHONE HYDROCHLORIDE 0.5 MG: 1 INJECTION, SOLUTION INTRAMUSCULAR; INTRAVENOUS; SUBCUTANEOUS at 19:15

## 2019-01-01 RX ADMIN — HALOPERIDOL LACTATE 2 MG: 5 INJECTION INTRAMUSCULAR at 17:19

## 2019-01-01 RX ADMIN — HALOPERIDOL LACTATE 2 MG: 5 INJECTION INTRAMUSCULAR at 19:20

## 2019-01-01 RX ADMIN — SODIUM CHLORIDE, PRESERVATIVE FREE 10 ML: 5 INJECTION INTRAVENOUS at 05:53

## 2019-01-01 RX ADMIN — Medication 300 UNITS: at 08:53

## 2019-01-01 RX ADMIN — HYDROMORPHONE HYDROCHLORIDE 0.5 MG: 1 INJECTION, SOLUTION INTRAMUSCULAR; INTRAVENOUS; SUBCUTANEOUS at 14:14

## 2019-01-01 RX ADMIN — HALOPERIDOL LACTATE 2 MG: 5 INJECTION INTRAMUSCULAR at 10:59

## 2019-01-01 RX ADMIN — SODIUM CHLORIDE, PRESERVATIVE FREE 10 ML: 5 INJECTION INTRAVENOUS at 14:37

## 2019-01-01 RX ADMIN — HALOPERIDOL LACTATE 2 MG: 5 INJECTION INTRAMUSCULAR at 17:22

## 2019-01-01 RX ADMIN — HYDROMORPHONE HYDROCHLORIDE 0.5 MG: 1 INJECTION, SOLUTION INTRAMUSCULAR; INTRAVENOUS; SUBCUTANEOUS at 20:32

## 2019-01-01 RX ADMIN — HALOPERIDOL LACTATE 2 MG: 5 INJECTION INTRAMUSCULAR at 12:09

## 2019-01-01 RX ADMIN — LIDOCAINE HYDROCHLORIDE 2.5 ML: 20 SOLUTION ORAL; TOPICAL at 06:32

## 2019-01-01 RX ADMIN — LIDOCAINE HYDROCHLORIDE 2.5 ML: 20 SOLUTION ORAL; TOPICAL at 09:46

## 2019-01-01 RX ADMIN — HALOPERIDOL LACTATE 2 MG: 5 INJECTION INTRAMUSCULAR at 04:17

## 2019-01-01 RX ADMIN — Medication 300 UNITS: at 21:36

## 2019-01-01 RX ADMIN — HYDROMORPHONE HYDROCHLORIDE 1 MG: 1 INJECTION, SOLUTION INTRAMUSCULAR; INTRAVENOUS; SUBCUTANEOUS at 21:55

## 2019-01-01 RX ADMIN — HYDROMORPHONE HYDROCHLORIDE 0.5 MG: 1 INJECTION, SOLUTION INTRAMUSCULAR; INTRAVENOUS; SUBCUTANEOUS at 18:32

## 2019-01-01 RX ADMIN — HALOPERIDOL LACTATE 2 MG: 5 INJECTION INTRAMUSCULAR at 14:36

## 2019-01-01 RX ADMIN — SODIUM CHLORIDE, PRESERVATIVE FREE 10 ML: 5 INJECTION INTRAVENOUS at 18:34

## 2019-01-01 RX ADMIN — HYDROMORPHONE HYDROCHLORIDE 0.5 MG: 1 INJECTION, SOLUTION INTRAMUSCULAR; INTRAVENOUS; SUBCUTANEOUS at 19:20

## 2019-01-01 RX ADMIN — HALOPERIDOL LACTATE 2 MG: 5 INJECTION INTRAMUSCULAR at 07:44

## 2019-01-01 RX ADMIN — HALOPERIDOL LACTATE 2 MG: 5 INJECTION INTRAMUSCULAR at 17:12

## 2019-01-01 RX ADMIN — Medication 10 ML: at 21:44

## 2019-01-01 RX ADMIN — Medication 10 ML: at 11:15

## 2019-01-01 RX ADMIN — SODIUM CHLORIDE, PRESERVATIVE FREE 10 ML: 5 INJECTION INTRAVENOUS at 17:12

## 2019-01-01 RX ADMIN — SODIUM CHLORIDE, PRESERVATIVE FREE 10 ML: 5 INJECTION INTRAVENOUS at 19:16

## 2019-01-01 RX ADMIN — HYDROMORPHONE HYDROCHLORIDE 1 MG: 1 INJECTION, SOLUTION INTRAMUSCULAR; INTRAVENOUS; SUBCUTANEOUS at 14:29

## 2019-01-01 RX ADMIN — Medication 10 ML: at 07:43

## 2019-01-01 RX ADMIN — SODIUM CHLORIDE, PRESERVATIVE FREE 10 ML: 5 INJECTION INTRAVENOUS at 12:36

## 2019-01-01 RX ADMIN — HYDROMORPHONE HYDROCHLORIDE 0.5 MG: 1 INJECTION, SOLUTION INTRAMUSCULAR; INTRAVENOUS; SUBCUTANEOUS at 15:36

## 2019-01-01 RX ADMIN — HALOPERIDOL LACTATE 2 MG: 5 INJECTION INTRAMUSCULAR at 21:43

## 2019-01-01 RX ADMIN — HYDROMORPHONE HYDROCHLORIDE 0.5 MG: 1 INJECTION, SOLUTION INTRAMUSCULAR; INTRAVENOUS; SUBCUTANEOUS at 07:40

## 2019-01-01 RX ADMIN — Medication 10 ML: at 09:41

## 2019-01-01 RX ADMIN — Medication 300 UNITS: at 09:41

## 2019-01-01 RX ADMIN — Medication 300 UNITS: at 07:43

## 2019-01-01 RX ADMIN — Medication 300 UNITS: at 22:00

## 2019-01-01 RX ADMIN — LIDOCAINE HYDROCHLORIDE 2.5 ML: 20 SOLUTION ORAL; TOPICAL at 21:16

## 2019-01-01 RX ADMIN — HYDROMORPHONE HYDROCHLORIDE 1 MG: 1 INJECTION, SOLUTION INTRAMUSCULAR; INTRAVENOUS; SUBCUTANEOUS at 17:22

## 2019-01-01 RX ADMIN — Medication 300 UNITS: at 20:30

## 2019-01-01 RX ADMIN — HALOPERIDOL LACTATE 2 MG: 5 INJECTION INTRAMUSCULAR at 19:16

## 2019-01-01 RX ADMIN — HYDROMORPHONE HYDROCHLORIDE 0.5 MG: 1 INJECTION, SOLUTION INTRAMUSCULAR; INTRAVENOUS; SUBCUTANEOUS at 17:20

## 2019-01-01 RX ADMIN — Medication 300 UNITS: at 21:51

## 2019-01-01 RX ADMIN — HYDROMORPHONE HYDROCHLORIDE 0.5 MG: 1 INJECTION, SOLUTION INTRAMUSCULAR; INTRAVENOUS; SUBCUTANEOUS at 07:42

## 2019-01-01 RX ADMIN — HYDROMORPHONE HYDROCHLORIDE 0.5 MG: 1 INJECTION, SOLUTION INTRAMUSCULAR; INTRAVENOUS; SUBCUTANEOUS at 09:40

## 2019-01-01 RX ADMIN — LIDOCAINE HYDROCHLORIDE 2.5 ML: 20 SOLUTION ORAL; TOPICAL at 17:12

## 2019-01-01 RX ADMIN — Medication 300 UNITS: at 08:25

## 2019-01-01 RX ADMIN — HYDROMORPHONE HYDROCHLORIDE 0.5 MG: 1 INJECTION, SOLUTION INTRAMUSCULAR; INTRAVENOUS; SUBCUTANEOUS at 17:21

## 2019-01-01 RX ADMIN — HALOPERIDOL LACTATE 2 MG: 5 INJECTION INTRAMUSCULAR at 17:31

## 2019-01-01 RX ADMIN — Medication 10 ML: at 20:31

## 2019-01-01 RX ADMIN — HALOPERIDOL LACTATE 2 MG: 5 INJECTION INTRAMUSCULAR at 05:19

## 2019-01-01 RX ADMIN — Medication 300 UNITS: at 09:00

## 2019-01-01 RX ADMIN — LIDOCAINE HYDROCHLORIDE 2.5 ML: 20 SOLUTION ORAL; TOPICAL at 17:18

## 2019-01-01 RX ADMIN — HYDROMORPHONE HYDROCHLORIDE 0.5 MG: 1 INJECTION, SOLUTION INTRAMUSCULAR; INTRAVENOUS; SUBCUTANEOUS at 11:15

## 2019-01-01 RX ADMIN — HALOPERIDOL LACTATE 2 MG: 5 INJECTION INTRAMUSCULAR at 07:34

## 2019-01-01 RX ADMIN — LIDOCAINE HYDROCHLORIDE 2.5 ML: 20 SOLUTION ORAL; TOPICAL at 06:00

## 2019-01-01 RX ADMIN — GLYCOPYRROLATE 0.2 MG: 0.2 INJECTION, SOLUTION INTRAMUSCULAR; INTRAVENOUS at 19:15

## 2019-01-01 RX ADMIN — HYDROMORPHONE HYDROCHLORIDE 0.5 MG: 1 INJECTION, SOLUTION INTRAMUSCULAR; INTRAVENOUS; SUBCUTANEOUS at 07:04

## 2019-01-01 RX ADMIN — Medication 10 ML: at 12:12

## 2019-01-01 RX ADMIN — HALOPERIDOL LACTATE 2 MG: 5 INJECTION INTRAMUSCULAR at 05:20

## 2019-01-01 RX ADMIN — HYDROMORPHONE HYDROCHLORIDE 1 MG: 1 INJECTION, SOLUTION INTRAMUSCULAR; INTRAVENOUS; SUBCUTANEOUS at 23:19

## 2019-01-01 RX ADMIN — SODIUM CHLORIDE, PRESERVATIVE FREE 10 ML: 5 INJECTION INTRAVENOUS at 00:00

## 2019-01-01 RX ADMIN — HYDROMORPHONE HYDROCHLORIDE 0.5 MG: 1 INJECTION, SOLUTION INTRAMUSCULAR; INTRAVENOUS; SUBCUTANEOUS at 21:52

## 2019-01-01 RX ADMIN — Medication 10 ML: at 20:29

## 2019-01-01 RX ADMIN — HYDROMORPHONE HYDROCHLORIDE 1 MG: 1 INJECTION, SOLUTION INTRAMUSCULAR; INTRAVENOUS; SUBCUTANEOUS at 10:18

## 2019-01-01 RX ADMIN — SODIUM CHLORIDE, PRESERVATIVE FREE 10 ML: 5 INJECTION INTRAVENOUS at 19:20

## 2019-01-01 RX ADMIN — HYDROMORPHONE HYDROCHLORIDE 0.5 MG: 1 INJECTION, SOLUTION INTRAMUSCULAR; INTRAVENOUS; SUBCUTANEOUS at 14:54

## 2019-01-01 RX ADMIN — HYDROMORPHONE HYDROCHLORIDE 1 MG: 1 INJECTION, SOLUTION INTRAMUSCULAR; INTRAVENOUS; SUBCUTANEOUS at 00:06

## 2019-01-01 RX ADMIN — HYDROMORPHONE HYDROCHLORIDE 1 MG: 1 INJECTION, SOLUTION INTRAMUSCULAR; INTRAVENOUS; SUBCUTANEOUS at 06:21

## 2019-01-01 RX ADMIN — HYDROMORPHONE HYDROCHLORIDE 1 MG: 1 INJECTION, SOLUTION INTRAMUSCULAR; INTRAVENOUS; SUBCUTANEOUS at 10:14

## 2019-01-01 RX ADMIN — HYDROMORPHONE HYDROCHLORIDE 0.5 MG: 1 INJECTION, SOLUTION INTRAMUSCULAR; INTRAVENOUS; SUBCUTANEOUS at 10:52

## 2019-01-01 RX ADMIN — LIDOCAINE HYDROCHLORIDE 2.5 ML: 20 SOLUTION ORAL; TOPICAL at 02:54

## 2019-01-01 RX ADMIN — HALOPERIDOL LACTATE 2 MG: 5 INJECTION INTRAMUSCULAR at 14:54

## 2019-01-01 RX ADMIN — HYDROMORPHONE HYDROCHLORIDE 1 MG: 1 INJECTION, SOLUTION INTRAMUSCULAR; INTRAVENOUS; SUBCUTANEOUS at 17:49

## 2019-01-01 RX ADMIN — LIDOCAINE HYDROCHLORIDE 2.5 ML: 20 SOLUTION ORAL; TOPICAL at 21:50

## 2019-01-01 RX ADMIN — HALOPERIDOL LACTATE 2 MG: 5 INJECTION INTRAMUSCULAR at 11:15

## 2019-01-01 RX ADMIN — SODIUM CHLORIDE, PRESERVATIVE FREE 10 ML: 5 INJECTION INTRAVENOUS at 10:52

## 2019-01-01 RX ADMIN — HALOPERIDOL LACTATE 2 MG: 5 INJECTION INTRAMUSCULAR at 05:36

## 2019-01-01 RX ADMIN — HALOPERIDOL LACTATE 2 MG: 5 INJECTION INTRAMUSCULAR at 01:57

## 2019-01-01 RX ADMIN — HALOPERIDOL LACTATE 2 MG: 5 INJECTION INTRAMUSCULAR at 23:19

## 2019-01-01 RX ADMIN — SODIUM CHLORIDE, PRESERVATIVE FREE 10 ML: 5 INJECTION INTRAVENOUS at 11:01

## 2019-01-01 RX ADMIN — HYDROMORPHONE HYDROCHLORIDE 2 MG: 2 INJECTION, SOLUTION INTRAMUSCULAR; INTRAVENOUS; SUBCUTANEOUS at 11:41

## 2019-01-01 RX ADMIN — Medication 300 UNITS: at 12:12

## 2019-01-01 RX ADMIN — HYDROMORPHONE HYDROCHLORIDE 1 MG: 1 INJECTION, SOLUTION INTRAMUSCULAR; INTRAVENOUS; SUBCUTANEOUS at 08:53

## 2019-01-01 RX ADMIN — HYDROMORPHONE HYDROCHLORIDE 1 MG: 1 INJECTION, SOLUTION INTRAMUSCULAR; INTRAVENOUS; SUBCUTANEOUS at 12:50

## 2019-01-01 RX ADMIN — HYDROMORPHONE HYDROCHLORIDE 0.5 MG: 1 INJECTION, SOLUTION INTRAMUSCULAR; INTRAVENOUS; SUBCUTANEOUS at 17:31

## 2019-01-01 RX ADMIN — Medication 300 UNITS: at 20:31

## 2019-01-01 RX ADMIN — HYDROMORPHONE HYDROCHLORIDE 0.5 MG: 1 INJECTION, SOLUTION INTRAMUSCULAR; INTRAVENOUS; SUBCUTANEOUS at 10:58

## 2019-01-01 RX ADMIN — SODIUM CHLORIDE, PRESERVATIVE FREE 10 ML: 5 INJECTION INTRAVENOUS at 17:32

## 2019-01-01 RX ADMIN — HYDROMORPHONE HYDROCHLORIDE 1 MG: 1 INJECTION, SOLUTION INTRAMUSCULAR; INTRAVENOUS; SUBCUTANEOUS at 21:50

## 2019-01-01 RX ADMIN — HALOPERIDOL LACTATE 2 MG: 5 INJECTION INTRAMUSCULAR at 02:54

## 2019-01-01 RX ADMIN — SODIUM CHLORIDE, PRESERVATIVE FREE 10 ML: 5 INJECTION INTRAVENOUS at 12:50

## 2019-01-01 RX ADMIN — GLYCOPYRROLATE 0.2 MG: 0.2 INJECTION, SOLUTION INTRAMUSCULAR; INTRAVENOUS at 03:07

## 2019-01-01 RX ADMIN — HALOPERIDOL LACTATE 2 MG: 5 INJECTION INTRAMUSCULAR at 21:50

## 2019-01-01 RX ADMIN — HYDROMORPHONE HYDROCHLORIDE 0.5 MG: 1 INJECTION, SOLUTION INTRAMUSCULAR; INTRAVENOUS; SUBCUTANEOUS at 04:17

## 2019-01-01 RX ADMIN — HYDROMORPHONE HYDROCHLORIDE 1 MG: 1 INJECTION, SOLUTION INTRAMUSCULAR; INTRAVENOUS; SUBCUTANEOUS at 14:36

## 2019-01-01 RX ADMIN — Medication 10 ML: at 08:24

## 2019-01-01 RX ADMIN — SODIUM CHLORIDE, PRESERVATIVE FREE 10 ML: 5 INJECTION INTRAVENOUS at 14:29

## 2019-01-01 RX ADMIN — HYDROMORPHONE HYDROCHLORIDE 1 MG: 1 INJECTION, SOLUTION INTRAMUSCULAR; INTRAVENOUS; SUBCUTANEOUS at 06:01

## 2019-01-01 RX ADMIN — LORAZEPAM 1 MG: 2 INJECTION INTRAMUSCULAR; INTRAVENOUS at 20:32

## 2019-01-01 RX ADMIN — HALOPERIDOL LACTATE 2 MG: 5 INJECTION INTRAMUSCULAR at 23:59

## 2019-01-01 RX ADMIN — HALOPERIDOL LACTATE 2 MG: 5 INJECTION INTRAMUSCULAR at 12:50

## 2019-01-01 RX ADMIN — HALOPERIDOL LACTATE 2 MG: 5 INJECTION INTRAMUSCULAR at 12:36

## 2019-01-01 RX ADMIN — SODIUM CHLORIDE, PRESERVATIVE FREE 10 ML: 5 INJECTION INTRAVENOUS at 09:01

## 2019-01-01 RX ADMIN — HALOPERIDOL LACTATE 2 MG: 5 INJECTION INTRAMUSCULAR at 07:42

## 2019-01-01 RX ADMIN — HALOPERIDOL LACTATE 2 MG: 5 INJECTION INTRAMUSCULAR at 07:05

## 2019-01-01 RX ADMIN — HYDROMORPHONE HYDROCHLORIDE 1 MG: 1 INJECTION, SOLUTION INTRAMUSCULAR; INTRAVENOUS; SUBCUTANEOUS at 09:01

## 2019-01-01 RX ADMIN — SODIUM CHLORIDE, PRESERVATIVE FREE 10 ML: 5 INJECTION INTRAVENOUS at 14:54

## 2019-01-01 RX ADMIN — HALOPERIDOL LACTATE 2 MG: 5 INJECTION INTRAMUSCULAR at 08:53

## 2019-01-01 RX ADMIN — HYDROMORPHONE HYDROCHLORIDE 0.5 MG: 1 INJECTION, SOLUTION INTRAMUSCULAR; INTRAVENOUS; SUBCUTANEOUS at 07:35

## 2019-01-01 RX ADMIN — SODIUM CHLORIDE, PRESERVATIVE FREE 10 ML: 5 INJECTION INTRAVENOUS at 10:18

## 2019-01-01 RX ADMIN — Medication 10 ML: at 21:51

## 2019-01-01 RX ADMIN — SODIUM CHLORIDE, PRESERVATIVE FREE 10 ML: 5 INJECTION INTRAVENOUS at 10:14

## 2019-01-01 RX ADMIN — SODIUM CHLORIDE, PRESERVATIVE FREE 10 ML: 5 INJECTION INTRAVENOUS at 07:35

## 2019-01-01 RX ADMIN — SODIUM CHLORIDE, PRESERVATIVE FREE 10 ML: 5 INJECTION INTRAVENOUS at 07:05

## 2019-01-01 RX ADMIN — HALOPERIDOL LACTATE 2 MG: 5 INJECTION INTRAMUSCULAR at 08:24

## 2019-01-01 RX ADMIN — SODIUM CHLORIDE, PRESERVATIVE FREE 10 ML: 5 INJECTION INTRAVENOUS at 17:20

## 2019-01-08 PROBLEM — R10.13 EPIGASTRIC PAIN: Status: RESOLVED | Noted: 2018-01-01 | Resolved: 2019-01-01

## 2019-01-08 PROBLEM — E11.21 TYPE 2 DIABETES WITH NEPHROPATHY (HCC): Status: ACTIVE | Noted: 2019-01-01

## 2019-01-08 PROBLEM — Z01.810 PREOP CARDIOVASCULAR EXAM: Status: RESOLVED | Noted: 2017-08-07 | Resolved: 2019-01-01

## 2019-01-08 PROBLEM — R30.0 DIFFICULT OR PAINFUL URINATION: Status: RESOLVED | Noted: 2017-05-25 | Resolved: 2019-01-01

## 2019-01-08 PROBLEM — K29.00 ACUTE GASTRITIS WITHOUT BLEEDING: Status: RESOLVED | Noted: 2018-01-01 | Resolved: 2019-01-01

## 2019-01-08 PROBLEM — N48.29: Status: RESOLVED | Noted: 2017-01-17 | Resolved: 2019-01-01

## 2019-01-08 PROBLEM — R63.4 WEIGHT LOSS: Status: RESOLVED | Noted: 2018-01-01 | Resolved: 2019-01-01

## 2019-01-08 PROBLEM — K29.00 ACUTE SUPERFICIAL GASTRITIS WITHOUT HEMORRHAGE: Status: RESOLVED | Noted: 2018-01-01 | Resolved: 2019-01-01

## 2019-01-08 PROBLEM — R39.89 BLADDER PAIN: Status: RESOLVED | Noted: 2017-04-25 | Resolved: 2019-01-01

## 2019-03-28 PROBLEM — C78.7 HEPATIC METASTASIS (HCC): Status: ACTIVE | Noted: 2019-01-01

## 2019-03-28 PROBLEM — I42.0 DILATED CARDIOMYOPATHY (HCC): Status: ACTIVE | Noted: 2018-01-01

## 2019-03-28 PROBLEM — C25.9 PANCREATIC ADENOCARCINOMA (HCC): Status: ACTIVE | Noted: 2019-01-01

## 2019-03-28 PROBLEM — N17.9 AKI (ACUTE KIDNEY INJURY) (HCC): Status: ACTIVE | Noted: 2019-01-01

## 2019-03-28 PROBLEM — C79.9 METASTATIC ADENOCARCINOMA (HCC): Status: ACTIVE | Noted: 2019-01-01

## 2019-03-28 PROBLEM — R11.2 NAUSEA & VOMITING: Status: ACTIVE | Noted: 2018-01-01

## 2019-03-28 NOTE — PROGRESS NOTES
Admitted to [facility] with hospice dx of pancreatic cancer and symptoms of pain and nausea. Pt has NG tube to low intermittent suction. Port to right chest is accessed. Pt is alert and oriented x 3. States he has pain 5/10 but doesn't want pain med at this time. States pain goes to 10/10 when [facility] is lowered. Daughter present and orientation to hospice house given. Questions answered. States that pt's wife passed at [facility]. Pt uses urinal as needed. Pt requests to remain in his own clothes at this time. Bed low and SR up with tab alert on.     1731 Dilaudid 0.5 IV for pain 7/10 and Haldol 2 mg IV for nausea. 1835 Dilaudid 0.5 IV for pain 4/10.     1900 Report given to oncoming RN. Pt with visitors in room. No needs at this time.

## 2019-03-28 NOTE — MED STUDENT NOTES
History and Physical    Patient: Benji Rodriguez MRN: [id number]  SSN: [id number]    YOB: 1945  Age: 68 y.o. Sex: male      Subjective:      Benji Rodriguez is a 68 y.o. male who has been admitted to [facility] as a GIP with a primary diagnosis of metastatic pancreatic adenocarcinoma. He was diagnosed in 8/2017 after experiencing abdominal pain and weight loss. At the time of diagnosis, his cancer was borderline resectable and he underwent several cycles of chemo, experiencing a recurring pericardial effusion, but minimal disease progression. In 7/2018 he began experiencing increasing abdominal pain and was found to have local invasion of his stomach, duodenum, and liver. He completed chemo in 11/2018 and was stable until 1/2019 when he began to experience increasing pain, nausea, and weight loss. He was admitted to home hospice, but on 3/22 he began to experience profuse vomiting that could not be controlled at home and was seen in the ER and subsequently found to have a gastric outlet obstruction and an NG tube was placed for decompression of his GI tract. His daughter and he then requested he be admitted to the hospice house. At present, he is seen lying in bed, awake and alert. His daughter, Mackenzie Olson, is his POA and is a nurse practitioner at [employer]. He complains of moderate pain when lying in bed with his head raised, but 10/10 pain when he is laid flat. He is also nauseous. He is continent of bowel and bladder at this time and uses a urinal or catheterizes himself. Has been NPO for 6 days, with small sips of coffee occasionally. He states that he is an avid golfer and would love to recover enough from his illness to play golf one more time.     Past Medical History:   Diagnosis Date    Abdominal pain, unspecified site     Abnormal CXR 2006    & CT; tiny left mid lung nodule and similar nodule on the right    Acute bronchitis     Allergic rhinitis 10/15/2013    Benign skin growth of ear     Cervicalgia     Chemotherapy induced nausea and vomiting 10/17/2017    Chest wall pain, chronic     right sided    Diverticular disease     Essential hypertension, benign     Fasciitis, unspecified     GERD (gastroesophageal reflux disease) 10/15/2013    Hip pain     IFG (impaired fasting glucose)     Impacted cerumen     Intrinsic asthma, unspecified 10/15/2013    Kidney stone     Lumbago     Lung nodule 10/15/2013    Stable left mid lung nodule since 2006.   Felt to represent granuloma     Mixed hyperlipidemia     Neoplasm of uncertain behavior of skin     Nephrolithiasis     Obesity 10/15/2013    Osteoarthrosis, unspecified whether generalized or localized, ankle and foot     Other ill-defined conditions(799.89) 8/2011    cardiac workup; normal per patient report    Pancreatic cancer ([facility] [address] 08/2017    Personal history of colonic polyps     Personal history of tobacco use, presenting hazards to health 10/15/2013    Prostate CA ([facility] [address]     12/2008 Radical prostatectomy    Renal cyst     Sciatica     Temporomandibular joint articular disc disorder     Type II or unspecified type diabetes mellitus without mention of complication, not stated as uncontrolled     checks QAM normal 115-130, no hyposymptoms    Unspecified asthma(493.90)      Past Surgical History:   Procedure Laterality Date    HX CATARACT REMOVAL Bilateral 8/2014    w IOL    HX CATARACT REMOVAL Bilateral 04/01/2016    unsure of exact date    HX COLONOSCOPY      HX ENDOSCOPY      kidney stones    HX FRACTURE TX      right ankle and leg    HX GI      HX HERNIA REPAIR      HX OTHER SURGICAL      Pericardiocentesis    HX RADICAL PROSTATECTOMY  12/2008    HX UROLOGICAL      bladder stricture             Allergies   Allergen Reactions    Latex, Natural Rubber Rash    Linaclotide Diarrhea    Diazepam Other (comments)    Jardiance [Empagliflozin] Other (comments)     Causes stomach and bladder issues    Morphine Other (comments)     Other reaction(s): Hallucinations-Intolerance    Pcn [Penicillins] Rash    Sulfa (Sulfonamide Antibiotics) Rash     Family History:       no family history of GI cancers. Father-lung cancer       Mother-stroke    Social History:      He is a former smoker, still smokes cigars when he can      Used to be a social drinker     Has one daughter, Bella Maria, who is his POA    Review of Systems:  A comprehensive review of systems was negative except for that written in the History of Present Illness. Objective:     Vitals:    03/28/19 1558   BP: 142/87   Pulse: 83   Resp: 19   Temp: 98.8 °F (37.1 °C)        Physical Exam:  GENERAL: alert, cooperative, no distress, appears stated age, pleasant, no scleral icterus  LUNG: wheezes L anterior, no increased respiratory effort, R-sided port intact  HEART: regular rate and rhythm, S1, S2 normal, no murmur, click, rub or gallop  ABDOMEN: soft, slight generalized tenderness. Bowel sounds decreased.  No masses,  no organomegaly  EXTREMITIES:  extremities normal, atraumatic, no cyanosis or edema, no edema, redness or tenderness in the calves or thighs, distal pulses 2+  SKIN: Normal. and no rash or abnormalities  NEUROLOGIC: A&Ox4, able to move all extremities,  PEERLA,     NG tube in place and on low intermittent suction    Assessment:     Hospital Problems  Date Reviewed: 1/11/2019          Codes Class Noted POA    * (Principal) Pancreatic adenocarcinoma ([facility] [address] ICD-10-CM: C25.9  ICD-9-CM: 157.9  3/28/2019 Unknown    Overview Signed 3/28/2019  3:54 PM by Salima Alonzo NP     Metastatic              Metastatic adenocarcinoma ([facility] [address] ICD-10-CM: C79.9  ICD-9-CM: 199.1  3/28/2019 Unknown    Overview Signed 3/28/2019  3:59 PM by Salima Alonzo NP     Duodenum and gastric antrum             Hepatic metastasis ([facility] [address] ICD-10-CM: C78.7  ICD-9-CM: 197.7  3/28/2019 Unknown        Malignant neoplasm of head of pancreas ([facility] [address] ICD-10-CM: C25.0  ICD-9-CM: 157.0 5/25/2017 Yes    Overview Addendum 7/3/2018  1:48 PM by Lucie Her LPN                        Plan:     Mr. Matty Allison is a pleasant 66yo gentleman admitted 3/28 to [facility] as a GIP with a primary diagnosis of metastatic pancreatic adenocarcinoma. He has been admitted from home hospice due to intractable N/V. We will manage his pain, N/V, and anxiety and provide family/patient support during his admission here. 1. Pain: we will prescribe Dilaudid 0.5mg IV or SC Q30min PRN    2. Nausea/Vomiting: We will prescribe Ativan 1mg IV Q4H PRN. This can also be used for anxiety. 3. Family/Pt Support: Mr. Colletta Ren daughter is at bedside and understands his deteriorating condition. She appears to be adjusting appropriately to the reality of her father's illness and his imminent demise. The patient and his daughter are accepting of spiritual support from the [facility] chaplains. We will continue to provide general and spiritual comfort to the patient and his family and will palliate symptoms as they arise. With Mr. Colletta Ren rapid deterioration and his new gastric outlet obstruction, I do not anticipate that he will be able to tolerate PO meds. If he remains NPO, we would expect his death to occur within the next 2 weeks. We will continue to keep him comfortable as he rapidly approaches his end. PPS 30%    Signed By: Kelly Cobb     March 28, 2019      *ATTENTION:  This note has been created by a medical student for educational purposes only. Please do not refer to the content of this note for clinical decision-making, billing, or other purposes. Please see attending physicians note to obtain clinical information on this patient. *

## 2019-03-29 NOTE — PROGRESS NOTES
Report received at 2300. Patient resting quietly in bed NG tube intact. NO s/sx of pain or distress, eyes closed. Bed low and locked, tab alert in place call light in reach SR up and door left open for continuous monitoring.

## 2019-03-29 NOTE — PROGRESS NOTES
1940 Received care of patient from off-going nurse. Pt sitting up in bed full clothed. Pt informed me that he will sleep in his clothing. Pt NG tube is to low intermittent suction. Pt denies nausea and vomiting. Family at bedside. Pt has beef broth packs in the kitchen on [facility]. Pt alert and oriented x3. No s/sx of pain or distress. 2020 Pt complained of \"very strong\" abdominal pain and anxiety. Pt was medicated per orders. 2050 Pt sleeping with no s/sx of pain or distress.

## 2019-03-29 NOTE — PROGRESS NOTES
Pt was sitting up in the bed. Tube in nose. Pt was eating ice chips. Pt was alert and verbal with no pain level expressed or observed. Pt appeared comfortable. Pt's daughter, Katerina Atkins, and a close female friend were present for visit. Katerina Atkins arrived during visit. Katerina Atkins stated that pt was having some discomfort due to NG Tube. Pt is a [place] living his michele in the [employer] tradition. Pt and family are believers. Pt is a member of [employer] where Rev. Vero Castillo is the . [facility] and [employer] members are active in pt's care and visit. Pt's daughter lives in [place], [place] and works as a NP at [employer]. No spiritual concerns were shared.  services are declined due to their [facility] and [employer] being so involved. Pt/family will contact  if/when services are needed.  provided spiritual care through presence, pastoral conversation, sharing of scripture, prayer, and assurance of prayer and our presence when needed/requested.

## 2019-03-29 NOTE — [facility] IDG MASTER NOTE
Hospice Interdisciplinary Group Collaborative  Date: 03/29/19  Time: 4:38 PM    ___________________    Patient: Lowery Schwab    ___________________    Diagnoses: There were no encounter diagnoses.     Current Medications:    Current Facility-Administered Medications:     lidocaine (XYLOCAINE) 2 % viscous solution 2.5 mL, 2.5 mL, Mouth/Throat, Q2H PRN, Annmarie Peralta MD, 2.5 mL at 03/29/19 1416    fentaNYL (DURAGESIC) 25 mcg/hr patch 1 Patch, 1 Patch, TransDERmal, Q72H, Idalia Healy NP, 1 Patch at 03/29/19 1415    HYDROmorphone (DILAUDID) syringe 0.5 mg, 0.5 mg, SubCUTAneous, Q30MIN PRN **OR** HYDROmorphone (DILAUDID) syringe 0.5 mg, 0.5 mg, IntraVENous, Q30MIN PRN, Idalia Healy NP, 0.5 mg at 03/29/19 1414    acetaminophen (TYLENOL) suppository 650 mg, 650 mg, Rectal, Q3H PRN, Idalia Healy NP    glycopyrrolate (ROBINUL) injection 0.2 mg, 0.2 mg, IntraVENous, Q4H PRN, Idalia Healy NP    sodium chloride (NS) flush 10 mL, 10 mL, InterCATHeter, Q12H, Idalia Healy NP, 10 mL at 03/29/19 1115    heparin (porcine) pf 300 Units, 300 Units, InterCATHeter, Q12H, Idalia Healy NP, 300 Units at 03/29/19 0900    sodium chloride (NS) flush 10 mL, 10 mL, InterCATHeter, PRN, Idalia Healy NP, 10 mL at 03/29/19 1221    heparin (porcine) pf 300 Units, 300 Units, InterCATHeter, PRN, Idalia Healy NP, Stopped at 03/29/19 1115    LORazepam (ATIVAN) injection 1 mg, 1 mg, IntraVENous, Q4H PRN, Idalia Healy NP, 1 mg at 03/28/19 2032    haloperidol lactate (HALDOL) injection 2 mg, 2 mg, SubCUTAneous, Q1H PRN **OR** haloperidol lactate (HALDOL) injection 2 mg, 2 mg, IntraVENous, Q1H PRN, Idalia Healy NP, 2 mg at 03/29/19 1415    haloperidol lactate (HALDOL) injection 2 mg, 2 mg, SubCUTAneous, Q1H PRN **OR** haloperidol lactate (HALDOL) injection 2 mg, 2 mg, IntraVENous, Q1H PRN, Idalia Healy NP    Orders:  Orders Placed This Encounter    IP CONSULT TO SPIRITUAL CARE Once on week one, then PRN. For [facility] patients only. For contracted patients, primary hospice will continue to manage spiritual care needs     Once on week one, then PRN. For [facility] patients only. For contracted patients, primary hospice will continue to manage spiritual care needs     Standing Status:   Standing     Number of Occurrences:   1     Order Specific Question:   Reason for Consult: Answer:   Spiritual crisis intervention or per patient or caregiver request    DIET CLEAR LIQUID     Standing Status:   Standing     Number of Occurrences:   1     Order Specific Question:   Likes/Dislikes/Preferences     Answer:   may have ice cream if desired. Prefers beef broth instead of chicken broth. May have coffee with cream if he desires.  VITAL SIGNS     Standing Status:   Standing     Number of Occurrences:   1    VITAL SIGNS     Standing Status:   Standing     Number of Occurrences:   1    NURSING-MISCELLANEOUS: comfort measures Enter comfort measures above. CONTINUOUS     Enter comfort measures above. Standing Status:   Standing     Number of Occurrences:   1     Order Specific Question:   Description of Order:     Answer:   comfort measures    CELAYA CATHETER, CARE     1. Celaya Catheter care every shift and PRN  2. Notify Physician of Celaya Catheter leakage, occlusion, gross adherent sediment or accidental removal  3. Change Celaya 30 days after insertion. 4. May flush catheter bid and prn leakage or gross adherent sediment or mucus. Standing Status:   Standing     Number of Occurrences:   1    BLADDER CHECKS     May scan bladder PRN for urinary retention and or patient discomfort     Standing Status:   Standing     Number of Occurrences:   1    NURSING ASSESSMENT:  SPECIFY Assess for GIP, routine, or respite level of care.  Q SHIFT Routine     Standing Status:   Standing     Number of Occurrences:   1     Order Specific Question:   Please describe the test or procedure you would like to order.     Answer:   Assess for GIP, routine, or respite level of care.  PAIN ASSESSMENT Pain and Symptoms: Assess ever 4 hours and PRN, for GIP level of care. PRN Routine     Standing Status:   Standing     Number of Occurrences:   1     Order Specific Question:   Please describe the test or procedure you would like to order. Answer:   Pain and Symptoms: Assess ever 4 hours and PRN, for GIP level of care.  NURSING-MISCELLANEOUS: admit 3/28: ([facility]) Admitted GIP with metastatic adenocarcinoma of the pancreas for management of pain and nausea. Hospice diagnosis: metastatic adenocarcinoma of the pancreas. 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 .. 3/28: ([facility]) Admitted GIP with metastatic adenocarcinoma of the pancreas for management of pain and nausea. Hospice diagnosis: metastatic adenocarcinoma of the pancreas. 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 Non-associated: adenocarcinoma of the prostate survivorship. This is an order to admit Dereck Oconnor to inpatient hospice care at [address] [place] [address] for a second benefit interval.  He is a 67-year-old man with a 20 month history of metastatic adenocarcinoma of the pancreas in the last 2 months his CA 19-9 antigen has risen steadily from its mj. He has developed increasing nausea and vomiting. Failure of maximum efforts to control this at home led to his hospitalization at [facility] on March 23, 2019. Diagnostic studies demonstrated mechanical Gastric outlet obstruction Due to tumor extending out from the pancreatic primary encasing the duodenum in the gastric antrum causing their collapse.  Symptomatic relief has been obtained with IV anti-emetics and continuous low suction NG tube drainage. Patient is requiring parenteral opioid for management of his severe abdominal pain. Hepatic metastasis ,dehydration and acute renal failure, treatment related esophagitis, peripheral neuropathy, and cardiomyopathy Are significant Diagnoses related to his primary cancer which adversely affect his prognosis. Without IV fluid support this man's life expectancy is less than 10 days. He has discussed end-of-life care is oncologist, Dr. Herb Egan and has chosen to forgo further efforts at extending his life. Choosing instead to limit care to comfort measures only. Prostate cancer survival is an unrelated but significant diagnoses. He will transfer to the [address] [place] [address] as soon as can be accommodated there. Standing Status:   Standing     Number of Occurrences:   1     Order Specific Question:   Description of Order:     Answer:   admit    NURSING-MISCELLANEOUS: Omit Bowel Regime Omit Bowel Regime: OMIT BOWEL REGIME: Medically Contraindicated  CONTINUOUS     Omit Bowel Regime: OMIT BOWEL REGIME: Medically Contraindicated     Standing Status:   Standing     Number of Occurrences:   1     Order Specific Question:   Description of Order:     Answer:   Omit Bowel Regime    BEDREST W/ BEDSIDE COMMODE     Standing Status:   Standing     Number of Occurrences:   1    NURSING-MISCELLANEOUS: In and out cath In and out cath [id number] prn inability to void. Patient may use own supplies from home for in and out cath due to latex allergy. CONTINUOUS     In and out cath [id number] prn inability to void. Patient may use own supplies from home for in and out cath due to latex allergy. Standing Status:   Standing     Number of Occurrences:   1     Order Specific Question:   Description of Order:     Answer:    In and out cath    DO NOT RESUSCITATE     Standing Status:   Standing     Number of Occurrences:   1    OXYGEN CANNULA Liters per minute: 2; Indications for O2 therapy: RESPIRATORY DISTRESS PRN Routine     Standing Status:   Standing     Number of Occurrences:   1     Order Specific Question:   Liters per minute: Answer:   2     Order Specific Question:   Indications for O2 therapy     Answer:   RESPIRATORY DISTRESS    dexamethasone (DECADRON) 4 mg tablet     Sig: Take 4 mg by mouth daily.  SANCUSO 3.1 mg/24 hour ptwk transdermal patch     Si Patch by TransDERmal route every seven (7) days.  haloperidol (HALDOL) 1 mg tablet     Sig: Take 1 mg by mouth every six (6) hours as needed for Agitation.  phenol throat spray (CHLORASEPTIC) 1.4 % spray     Sig: Take 2 Sprays by mouth every two (2) hours as needed for Sore throat.  triamterene-hydroCHLOROthiazide (MAXZIDE) 37.5-25 mg per tablet     Sig: Take 1 Tab by mouth daily.  DISCONTD: LORazepam (ATIVAN) injection 1 mg    OR Linked Order Group     HYDROmorphone (DILAUDID) syringe 0.5 mg     HYDROmorphone (DILAUDID) syringe 0.5 mg    acetaminophen (TYLENOL) suppository 650 mg    glycopyrrolate (ROBINUL) injection 0.2 mg    sodium chloride (NS) flush 10 mL    heparin (porcine) pf 300 Units    sodium chloride (NS) flush 10 mL    heparin (porcine) pf 300 Units    LORazepam (ATIVAN) injection 1 mg    OR Linked Order Group     haloperidol lactate (HALDOL) injection 2 mg     haloperidol lactate (HALDOL) injection 2 mg    OR Linked Order Group     haloperidol lactate (HALDOL) injection 2 mg     haloperidol lactate (HALDOL) injection 2 mg    DISCONTD: magic mouthwash    lidocaine (XYLOCAINE) 2 % viscous solution 2.5 mL    fentaNYL (DURAGESIC) 25 mcg/hr patch 1 Patch    INITIAL PHYSICIAN ORDER: HOSPICE Level Of Care: General Inpatient; Reason for Admission: 3/28: (S) Admitted GIP with metastatic adenocarcinoma of the pancreas for management of pain and nausea.      Standing Status:   Standing     Number of Occurrences:   1     Order Specific Question:   Status     Answer:   Hospice     Order Specific Question:   Level Of Care     Answer:   General Inpatient     Order Specific Question:   Reason for Admission     Answer:   3/28: (S) Admitted GIP with metastatic adenocarcinoma of the pancreas for management of pain and nausea. Order Specific Question:   Inpatient Hospitalization Certified Necessary for the Following Reasons     Answer:   3. Patient receiving treatment that can only be provided in an inpatient setting (further clarification in H&P documentation)     Order Specific Question:   Admitting Diagnosis     Answer:   Pancreatic adenocarcinoma [facility]) [[id number]]     Order Specific Question:   Terminal Prognosis Diagnosis(es)     Answer:   Pancreatic adenocarcinoma [facility]) [[id number]]     Order Specific Question:   Admitting Physician     Answer:   Heber Angel     Order Specific Question:   Attending Physician     Answer:   Heber Angel     Order Specific Question:   Discharge Plan:     Answer: Other (Specify)    IP CONSULT TO SOCIAL WORK     Once on week one, then PRN for Psychosocial crisis intervention or per patient or caregiver request.  For [facility] patients only. For contracted patients, primary hospice will continue to manage social work needs. Standing Status:   Standing     Number of Occurrences:   1     Order Specific Question:   Reason for Consult: Answer: Once on week one, then PRN for Psychosocial crisis intervention or per patient or caregiver request.       Allergies:   Allergies   Allergen Reactions    Latex, Natural Rubber Rash    Linaclotide Diarrhea    Diazepam Other (comments)    Jardiance [Empagliflozin] Other (comments)     Causes stomach and bladder issues    Morphine Other (comments)     Other reaction(s): Hallucinations-Intolerance    Pcn [Penicillins] Rash    Sulfa (Sulfonamide Antibiotics) Rash       Care Plan:  Multidisciplinary Problems (Active)     Problem: Falls - Risk of     Dates: Start: 03/28/19       Description:     Disciplines: Interdisciplinary Goal: *Absence of Falls     Dates: Start: 03/28/19       Description: Document Alfred Melendez Fall Risk and appropriate interventions in the flowsheet. Disciplines: Interdisciplinary                Problem: Patient Education: Go to Patient Education Activity     Dates: Start: 03/28/19       Description:     Disciplines: Interdisciplinary    Goal: Patient/Family Education     Dates: Start: 03/28/19       Description:     Disciplines: Interdisciplinary                Problem: Patient Education: Go to Patient Education Activity     Dates: Start: 03/28/19       Description:     Disciplines: Interdisciplinary    Goal: Patient/Family Education     Dates: Start: 03/28/19       Description:     Disciplines: Interdisciplinary                Problem: Pressure Injury - Risk of     Dates: Start: 03/28/19       Description:     Disciplines: Interdisciplinary    Goal: *Prevention of pressure injury     Dates: Start: 03/28/19       Description: Document Joel Scale and appropriate interventions in the flowsheet. Disciplines: Interdisciplinary                    ___________________    Care Team Notes          POC/IDG Notes      [facility] IDG  Notes by Aman Rust at 03/29/19 7379  Version 1 of 1    Author:  Aman Rust Service:  Evelina Banks Author Type:  Pastoral Care    Filed:  03/29/19 5033 Date of Service:  03/29/19 5099 Status:  Signed    :  Aman Rust (Pastoral Care)       Patient: Morenita Vickers    Date: 03/29/19  Time: 3:59 PM    [facility]  Notes    Spiritual care/support to be provided as appropriate, pending assessment by  of whether patient/family accepts or declines Spiritual care/support.         Signed by: Berhane ALVARADO [facility] IDG Volunteer Notes by Myra Corral at 03/29/19 1385  Version 1 of 1    Author:  Myra Corral Service:  Evelina Banks Author Type:  Hospice Volunteer/    Filed:  03/29/19 6920 Date of Service:  03/29/19 8224 Status:  Signed    : Talat Kong (Hospice Volunteer/)           [facility] Interdisciplinary Plan of Care Review     Status Codes I = Initiated C=Continued R=Revised RS = Resolved     I.  Volunteer     Goal: Hospice house volunteer (s) enhances the quality of remaining life while patient is at the hospice house. Interventions: Delfino Bethea Volunteer (s) will provide companionship to the patient and/or family by visiting at the hospice house       . Delfino Bethea Volunteer (s) will provide respite as needed when requested by patient and/or family. Delfino Alexandra  Volunteer will provide activities such as music, reading, pet therapy, etc. as requested. Delfino Alexandra  Comfort bag delivered. Any other special requests or information regarding volunteer services:     No further needs identified at this time. These notes have been discussed in [facility] meeting. Signed by: Yoselyn PERALES Nurse Notes by Kenan Diaz RN at 03/29/19 6025  Version 1 of 1    Author:  Kenan Diaz RN Service:  Luanne Reagan Author Type:  Registered Nurse    Filed:  03/29/19 6026 Date of Service:  03/29/19 125 Status:  Signed    :  Kenan Diaz RN (Registered Nurse)       Patient: Hilario Hayden    Date: 03/29/19  Time: 12:54 PM    [facility] Nurse Notes  69 yo male patient with pancreatic cancer who is general in patient for pain, nausea and agitation. Pt is being in-out cathed with qs. Pt has NG tube to LIS. He is taking PO for pleasure. Pt has had Haldol 4 doses in last 24 hours. Dilaudid x 5 for pain control. Medications are being administered IV. Fentanyl 25 mcg being added this IDG. Pt has a latex allergy and utilizes his own kits for I/O cath. Comprehensive plan of care reviewed. IDG and pt./family in agreement with plan of care.  The IDG identifies through on-going assessment when a change is needed to the POC; the pt/family will receive care and services necessitated by changes in [facility]. Medications reviewed by the pharmacist and Medical Director.           Signed by: Nisha Manley RN       [facility] IDG  Notes by Lynn Ye LMSW at 03/29/19 1251  Version 1 of 1    Author:  Lynn Ye LMSW Service:  Licensed Clinical  Author Type:  Licensed Masters in Social Work    Filed:  03/29/19 1259 Date of Service:  03/29/19 6112 Status:  Signed    :  Lynn Ye LMSW (Licensed Masters in Social Work)       Patient: Hiren Morris    Date: 03/29/19  Time: 12:55 PM    [facility]  Notes  No concerns reported; Continue to provide ongoing emotional support        Signed by: Jeimy Farias LMSW

## 2019-03-29 NOTE — [facility] IDG CHAPLAIN NOTES
Patient: Lowery Schwab    Date: 03/29/19  Time: 3:59 PM    \[facility]\""  Notes    Spiritual care/support to be provided as appropriate, pending assessment by SW of whether patient/family accepts or declines Spiritual care/support.         Signed by: Wilfredo Vela

## 2019-03-29 NOTE — [facility] IDG NURSE NOTES
Patient: La Friday    Date: 03/29/19  Time: 12:54 PM    [facility] Nurse Notes  67 yo male patient with pancreatic cancer who is general in patient for pain, nausea and agitation. Pt is being in-out cathed with qs. Pt has NG tube to LIS. He is taking PO for pleasure. Pt has had Haldol 4 doses in last 24 hours. Dilaudid x 5 for pain control. Medications are being administered IV. Fentanyl 25 mcg being added this IDG. Pt has a latex allergy and utilizes his own kits for I/O cath. Comprehensive plan of care reviewed. IDG and pt./family in agreement with plan of care. The IDG identifies through on-going assessment when a change is needed to the POC; the pt/family will receive care and services necessitated by changes in POC. Medications reviewed by the pharmacist and Medical Director.           Signed by: Miguel Angel Berg RN

## 2019-03-29 NOTE — PROGRESS NOTES
Pt I'd by name and . Pt calm. C/O abd pain 6. C/O nausea 5. Dilaudid 0.5mg and haldol 2mg ivp given. Resp non labored on RA. Lungs diminished. NG tube to LIS. Canister changed out. BS diminished. HR reg. No edema noted at this time. Fentanyl 25 mcg/hr patch intact on Left arm and dated 3/29/19. SR up x2. Bed low/locked. Call light with in reach. Door opened. Tab alerts on. Family at the bedside.

## 2019-03-29 NOTE — PROGRESS NOTES
Report received. Pt round. Pt identified by name. In bed with eyes open. No s/s of pain, agitation or dyspnea. NG tube to low intermittent suction. Denies needs at this time. Bed low and SR up with tab alerts on.    0740 Dilaudid 0.5 mg IV for pain 5/10 and Haldol 2 mg IV for nausea. 7654 Pt states med worked and he does not need more at this time. Family at bedside. 1115 Dilaudid 0.5 mg IV for pain and Haldol 2 mg IV for nausea. 1215 Pt performed self cath for 950cc clear dark yellow urine. 1720 Haldol 2 mg IV for nausea and Dilaudid 0.5 mg IV for pain. 1805 Pt with visitors in room. States he does not need any more medication at this time.      1900 Report given to oncoming RN

## 2019-03-29 NOTE — [facility] IDG VOLUNTEER NOTES
[address] Review     Status Codes I = Initiated C=Continued R=Revised RS = Resolved     I.  Volunteer     Goal: Hospice house volunteer (s) enhances the quality of remaining life while patient is at the hospice house. Interventions: Anatoliy Menjivar Volunteer (s) will provide companionship to the patient and/or family by visiting at the hospice house       . Anatoliy Menjivar Volunteer (s) will provide respite as needed when requested by patient and/or family. Anatoliy Burt  Volunteer will provide activities such as music, reading, pet therapy, etc. as requested. Anatoliy Burt  Comfort bag delivered. Any other special requests or information regarding volunteer services:     No further needs identified at this time. These notes have been discussed in [facility] meeting.         Signed by: Brionna Guillory

## 2019-03-29 NOTE — [facility] IDG SOCIAL WORKER NOTES
Patient: Rito Camachopool    Date: 03/29/19  Time: 12:55 PM    [facility]  Notes  No concerns reported; Continue to provide ongoing emotional support        Signed by: Isaura Hogan LMSW

## 2019-03-29 NOTE — MED STUDENT NOTES
Progress Note    Patient: Dong Plaza MRN: [id number]  SSN: [id number]    YOB: 1945  Age: 68 y.o. Sex: male      Admit Date: 3/28/2019    LOS: 1 day     Subjective:     Seen with daughter and nephew at bedside. The patient states he feels good today and enjoys his coffee he got with his breakfast. Requests to do in-and-out cath, as he is unable to use the urinal and does not want a bro placed due to it causing him bladder spasms in the past. Daughter went home to get his supplies and states that when her father is no longer responsive she wants him to have a bro placed. Had an episode of increased pain overnight relieved with Dilaudid. Per MAR, Has required Haldol x4, Dilaudid x5, and Ativan x1 over the past 24 hours. Patient states that his pain levels can rise fairly high before. He asks for received when necessary hydromorphone. Review of Systems:  Denies pain, N/V, or general discomfort. Objective:     Vitals:    03/28/19 1558 03/29/19 0410   BP: 142/87 100/62   Pulse: 83 100   Resp: 19 9   Temp: 98.8 °F (37.1 °C) 96.5 °F (35.8 °C)        Intake and Output:  Current Shift: 03/29 0701 - 03/29 1900  In: 240 [P.O.:240]  Out: -   Last three shifts: 03/27 1901 - 03/29 0700  In: -   Out: 350 [Urine:350]    Physical Exam:   GENERAL: alert, cooperative, no distress, appears stated age, NG tube intact on low intermittent suction  LUNG: crackles at lung bases, no increased respiratory effort  HEART: regular rate and rhythm, S1, S2 normal, no murmur, click, rub or gallop  ABDOMEN: soft, non-tender. with NG tube clamped Bowel sounds are normal. No masses,  no organomegaly  EXTREMITIES:  extremities normal, atraumatic, no cyanosis or edema, no edema, redness or tenderness in the calves or thighs, distal pulses 2+  SKIN: Normal. and no rash or abnormalities  NEUROLOGIC: A&O x3, doses of frequently during our interview, moves all extremities, PEERLA,  strength intact bilaterally        Assessment:     1.gastric outlet obstruction secondary to recurrence of pancreatic cancer 2 years post resection. 2.  Nasogastric tube providing excellent of nausea and allowing the patient to drink some fluids. The pleasure including ice cream.  3.  Pain management is less than fully satisfactory on when necessary hydromorphone. A long acting opioid analgesic will be necessary in combination with the fast acting hydromorphone. Start fentanyl 25 MCG per hour patch, change every 72  4. Urinary retention status post radical prostatectomy continue to allow the patient to straight catheter for bladder drainage as he has been accustomed past several years.   Hawley catheter will be reconsidered when the patient is unable to effectively self cath    Plan:     Current Facility-Administered Medications   Medication Dose Route Frequency    lidocaine (XYLOCAINE) 2 % viscous solution 2.5 mL  2.5 mL Mouth/Throat Q2H PRN    HYDROmorphone (DILAUDID) syringe 0.5 mg  0.5 mg SubCUTAneous Q30MIN PRN    Or    HYDROmorphone (DILAUDID) syringe 0.5 mg  0.5 mg IntraVENous Q30MIN PRN    acetaminophen (TYLENOL) suppository 650 mg  650 mg Rectal Q3H PRN    glycopyrrolate (ROBINUL) injection 0.2 mg  0.2 mg IntraVENous Q4H PRN    sodium chloride (NS) flush 10 mL  10 mL InterCATHeter Q12H    heparin (porcine) pf 300 Units  300 Units InterCATHeter Q12H    sodium chloride (NS) flush 10 mL  10 mL InterCATHeter PRN    heparin (porcine) pf 300 Units  300 Units InterCATHeter PRN    LORazepam (ATIVAN) injection 1 mg  1 mg IntraVENous Q4H PRN    haloperidol lactate (HALDOL) injection 2 mg  2 mg SubCUTAneous Q1H PRN    Or    haloperidol lactate (HALDOL) injection 2 mg  2 mg IntraVENous Q1H PRN    haloperidol lactate (HALDOL) injection 2 mg  2 mg SubCUTAneous Q1H PRN    Or    haloperidol lactate (HALDOL) injection 2 mg  2 mg IntraVENous Q1H PRN   plan:    Mr. Francy Wallace is a pleasant 66yo gentleman admitted 3/28 to [facility] as a GIP with a primary diagnosis of metastatic pancreatic adenocarcinoma. We will manage his pain, N/V, and anxiety and provide family/patient support during his admission here. He is on a clear liquid diet and prefers coffee with cream, ice cream, and beef broth.     1. Pain: we will prescribe fentanyl 25mcg Q72H patch and Dilaudid 0.5mg IV or SC Q30min PRN for breakthrough pain     2. Nausea/Vomiting: We will prescribe Ativan 1mg IV Q4H PRN. This can also be used for anxiety.     3. Family/Pt Support: Mr. Gudelia Augustine daughter and nephew are at bedside and understand his deteriorating condition. He has had several visitors today and they seem to understand his condition as well. They welcome  support from [facility] also.     With Mr. Gudelia Augustine rapid deterioration and his new gastric outlet obstruction, I do not anticipate that he will be able to tolerate PO meds. If he remains NPO, we would expect his death to occur within the next10 days We will continue to keep him comfortable as he rapidly approaches his end. We discussed with the patient's our plans for symptom management in the base of the dehydration and renal failure. He was reassured. We will stay distress.     PPS 30%    Signed By: Elisabeth Sherman     March 29, 2019          I have examined this patient and have modified the medical student's progress note above to reflect my own observations.   Luli Chow M.D.

## 2019-03-30 NOTE — PROGRESS NOTES
Report received. Pt round. Pt identified by name. In bed with eyes open. Pt requests meds for pain and nausea. NG tube in place to low intermittent suction. Port access in right chest is patent. Fentanyl patch in place on left arm. Haldol 2 mg IV for nausea and Dilaudid 0.5 mg IV for pain. HOB elevated and pt drinking coffee. Bed low and SR up with tab alert on.     1209 Dilaudid 0.5 mg IV for pain and Haldol 2 mg IV for nausea. Friend at bedside. 1401 Pt self cathed for 900 cc urine. 1722 Dilaudid and Haldol given by Hayde Roberts RN.      1845 Report given to oncoming RN

## 2019-03-30 NOTE — PROGRESS NOTES
Pt I'd by name and . Pt calm. C/O abd pain 8. C/O nausea 4. Dilaudid 0.5mg and haldol 2mg ivp given. Resp non labored on RA. Lungs coarse. Non productive cough. Glyco 0.2mg ivp given. NG tube to LIS. BS diminished. HR reg. No edema noted at this time. Fentanyl 25 mcg/hr patch intact on Left arm and dated 3/29/19. SR up x2. Bed low/locked. Call light with in reach. Door opened. Tab alerts on. Family at the bedside.

## 2019-03-31 NOTE — PROGRESS NOTES
Report received. Pt round. Pt identified by name. In bed with eyes closed. Fentanyl patch in place on left upper arm. No s/s of pain, agitation or dyspnea. Bed low and SR up with tab alerts on.    0742 Haldol 2 mg IV for nausea and dilaudid 0.5 mg IV for pain. Pt resettled in bed and back to sleep. 1100 Haldol and dilaudid given by [facility] RN for pain and nausea    1457 Scheduled haldol given and  PRN dilaudid IV for pain. 1536 Dilaudid 0.5 mg IV for pain post bath. 1715 Pt sleeping with no s/s of distress. 1745 Self cath with RN assist for 750 cc dark yellow urine.      1800 Report given to oncoming RN

## 2019-03-31 NOTE — PROGRESS NOTES
Pt remains free of falls. Stage II pressure injury has developed on sacral area. Barrier cream and prevention applied.

## 2019-03-31 NOTE — [facility] IDG SOCIAL WORKER NOTES
Patient: Stacy Clock    Date: 03/31/19  Time: 3:13 PM    [facility]  Notes  SW visit with patient who is alert and oriented and his daughter Sonya Cesar is at his bedside. Patient son roxanna brought his and Sonya Cesar sons to see their grandfather. The boys are [de-identified] and [de-identified] year old and Sonya Cesar has explained to them what is going on and they all are coping fine. 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 Patient is and active member of [employer] and his [place] has visited. Patient wanted to come here, this is where is spouse passed away in 2011. Patient has strong michele and is coping appropriately as is his family members. SW will continue to assess ongoing and provide supportive counseling around grief and loss issues.         Signed by: rUi Petersen LMSW

## 2019-04-01 PROBLEM — K31.1 COMPLETE GASTRIC OUTLET OBSTRUCTION: Status: ACTIVE | Noted: 2019-01-01

## 2019-04-01 NOTE — [facility] IDG CHAPLAIN NOTES
Patient: Thelma     Date: 04/01/19  Time: 12:36 PM    [facility]  Notes    Patient/family decline spiritual care/support. Johnny Patton made initial spiritual assessment visit, and learned that family has good support from community of michele and for that reason declines routine spiritual care from [facility] staff.         Signed by: Che Lewis

## 2019-04-01 NOTE — PROGRESS NOTES
Pt I'd by name and . Pt calm. No distress. Denies pain at this time. Flacc =0-1. Resp non labored on RA. NG tube to LIS. BS diminished. HR reg. No edema noted at this time. Fentanyl 25 mcg/hr patch intact on Left arm and dated 3/29/19. SR up x2. Bed low/locked. Call light with in reach. Door opened. Tab alerts on. Family at the bedside.

## 2019-04-01 NOTE — [facility] IDG NURSE NOTES
Patient: Tomasa Norman    Date: 04/01/19  Time: 12:21 PM    [address] [place] [address] Nurse Notes  70 yo male patient with metastatic adenocarcinoma of the pancreas. General in patient for control of pain, nausea, dyspnea and agitation. Fentanyl 25 mcg. Schedule Haldol 2 mg q 8 hours. Dilaudid 0.5 mg x 5 doses for pain, Haldol 2 mg 3 doses in the last 24 hours for nausea. Medications are being administered IV via L chest port. Dilaudid is being increased to 1 mg q 30 min PRN. Pt has a bro with adequate UOP. NG tube to LIS for stomach decompression. Comfort feeding which is being extracted with NG tube. Glyco added for bladder spasms. Haldol increased to q 6 hours from q 8 hours PRN. Comprehensive plan of care reviewed. IDG and pt./family in agreement with plan of care. The IDG identifies through on-going assessment when a change is needed to the POC; the pt/family will receive care and services necessitated by changes in POC. Medications reviewed by the pharmacist and Medical Director.           Signed by: Renay Calvert RN

## 2019-04-01 NOTE — PROGRESS NOTES
Report received. Pt round. Pt identified by name. In bed with eyes open. States he would like pain medication and nausea med. Haldol 2 mg IV and Dialudid 2 mg IV given by Juan Rosa RN. NG tube to low intermittent suction. Hawley is draining clear yellow urine. Fentanyl patch is in place. Bed low and SR up with tab alert on.     0944 Haldol 2 mg IV for nausea and Dilaudid 0.5 mg IV for pain. Viscous lidocaine for throat pain. 1054 Dilaudid 0.5 mg IV for pain. 1030  Ativan 1 mg IV for agitation. Pt is becoming increasingly confused looking for remotes and his phone. 1315 Fentanyl patch placed on right arm.    1431 Dilaudid 1 mg IV for pain. 1724 Scheduled haldol, dilaudid 1 mg IV for pain in abdomen and viscous lidocaine for throat pain.      1900 Report given to oncoming RN

## 2019-04-01 NOTE — MED STUDENT NOTES
Progress Note    Patient: Morenita Vickers MRN: [id number]  SSN: [id number]    YOB: 1945  Age: 68 y.o. Sex: male      Admit Date: 3/28/2019    LOS: 4 days     Subjective:     Seen without family at bedside. States that his abdominal pain is worse and he is nauseous, but is able to take sips of coffee and bites of jello. States that he is not sleeping well, although, per nursing, has been noted to dose off during the day. Per nursing, he has begun experiencing episodes of confusion and has requested no visitors. Afebrile, normotensive. Per MAR, has required Haldol x3, Dilaudid x5, and Ativan x1 over the past 24 hours. Hawley was placed last night. Review of Systems:  Endorses nausea and pain in his abdomen. Denies fevers or other discomfort    Objective:     Vitals:    03/29/19 1625 03/30/19 0526 03/31/19 0636 04/01/19 0550   BP: 106/57 110/63 111/59 131/69   Pulse: 87 80 86 89   Resp: 14 16 18 18   Temp: 96.9 °F (36.1 °C) 96.6 °F (35.9 °C)  97.2 °F (36.2 °C)        Intake and Output:  Current Shift: No intake/output data recorded. Last three shifts: No intake/output data recorded. Physical Exam:   GENERAL: alert, cooperative, mild distress, appears stated age, NG tube intact on low intermittent suction  LUNG: diminished lung sounds, crackles in bases bilaterally  HEART: regular rate and rhythm  ABDOMEN: soft, epigastric and RUQ tenderness. Bowel sounds decreased.  No masses felt,  no organomegaly  EXTREMITIES:  extremities normal, atraumatic, no cyanosis or edema, distal pulses 2+ bilaterally  SKIN: Normal. and no rash or abnormalities  NEUROLOGIC: A&O x3, PEERLA, moves all extremities, answers questions appopriately    Hawley intact without kinks draining dark brownish/yellow urine    Assessment:     Principal Problem:    Pancreatic adenocarcinoma ([facility] [address] (3/28/2019)      Overview: Metastatic     Active Problems:    Malignant neoplasm of head of pancreas ([address] (5/25/2017)      Overview: 5-14-18  Not resectable when re-evaluated 4-19. [facility] (3/28/2019)      Overview: Duodenum and gastric antrum      Hepatic metastasis ([facility] (3/28/2019)    Plan:     Current Facility-Administered Medications   Medication Dose Route Frequency    haloperidol lactate (HALDOL) injection 2 mg  2 mg IntraVENous Q8H    lidocaine (XYLOCAINE) 2 % viscous solution 2.5 mL  2.5 mL Mouth/Throat Q2H PRN    fentaNYL (DURAGESIC) 25 mcg/hr patch 1 Patch  1 Patch TransDERmal Q72H    HYDROmorphone (DILAUDID) syringe 0.5 mg  0.5 mg SubCUTAneous Q30MIN PRN    Or    HYDROmorphone (DILAUDID) syringe 0.5 mg  0.5 mg IntraVENous Q30MIN PRN    acetaminophen (TYLENOL) suppository 650 mg  650 mg Rectal Q3H PRN    glycopyrrolate (ROBINUL) injection 0.2 mg  0.2 mg IntraVENous Q4H PRN    sodium chloride (NS) flush 10 mL  10 mL InterCATHeter Q12H    heparin (porcine) pf 300 Units  300 Units InterCATHeter Q12H    sodium chloride (NS) flush 10 mL  10 mL InterCATHeter PRN    heparin (porcine) pf 300 Units  300 Units InterCATHeter PRN    LORazepam (ATIVAN) injection 1 mg  1 mg IntraVENous Q4H PRN    haloperidol lactate (HALDOL) injection 2 mg  2 mg SubCUTAneous Q1H PRN    Or    haloperidol lactate (HALDOL) injection 2 mg  2 mg IntraVENous Q1H PRN    haloperidol lactate (HALDOL) injection 2 mg  2 mg SubCUTAneous Q1H PRN    Or    haloperidol lactate (HALDOL) injection 2 mg  2 mg IntraVENous Q1H PRN     Mr. Lazaro Rdz is a pleasant 66yo gentleman admitted 3/28 [facility] as [place] [facility] with a primary diagnosis of metastatic pancreatic adenocarcinoma. We will manage his pain, N/V, and anxiety and provide family/patient support during his admission here. He is on a clear liquid diet and prefers coffee with cream, ice cream, and beef broth. 1.Gastric outlet obstruction secondary to recurrence of pancreatic cancer 2 years post resection.  His NG tube is allowing him the pleasure of sipping coffee and eating ice cream while reducing his nausea. 2.  Nausea: Currenty receiving Haldol 2mg IV Q8H scheduled. We will increase this to Haldol 2mg IV Q1H scheduled due to his use of several PRNs over the past 24 hours. We also have available Haldol 2mg IV Q1H PRN for breakthrough nausea and Ativan 1mg IV Q4H PRN if the Haldol is ineffective. 3.  Pain: He is currently on a fentanyl 25mcg patch Q72H. Although he is also requiring increased PRN medications, they appear to be clustered together. Rather than increasing his fentanyl patch, we will increase his PRN Dilaudid from 0.5mg to 1mg  IV Q30MIN for breakthrough pain. 4.  Urinary retention status post radical prostatectomy: The patient was unable to self cath last night and agreed to have a bro placed. This was placed last night. He has previously had bladder spasms with bro catheters, so we will prescribe him glycopyrrolate 0.25mg IV Q4H PRN for any bladder spasms. 5. Family/Pt Support: The patient has had many visitors and his daughter remains at his bedside for several hours each day. We will continue to offer support to the patient and his family and friends until he passes. PPS 20%      Signed By: Yin Babcock     April 1, 2019        *ATTENTION:  This note has been created by a medical student for educational purposes only. Please do not refer to the content of this note for clinical decision-making, billing, or other purposes. Please see attending physicians note to obtain clinical information on this patient. *

## 2019-04-01 NOTE — [facility] IDG MASTER NOTE
Hospice Interdisciplinary Group Collaborative  Date: 04/01/19  Time: 3:57 PM    ___________________    Patient: Dereck Oconnor    ___________________    Diagnoses: There were no encounter diagnoses.     Current Medications:    Current Facility-Administered Medications:     HYDROmorphone (PF) (DILAUDID) injection 1 mg, 1 mg, SubCUTAneous, Q30MIN PRN **OR** HYDROmorphone (PF) (DILAUDID) injection 1 mg, 1 mg, IntraVENous, Q30MIN PRN, Amanda Lopez NP, 1 mg at 04/01/19 1429    glycopyrrolate (ROBINUL) injection 0.2 mg, 0.2 mg, IntraVENous, Q4H PRN, Amanda Lopez NP    haloperidol lactate (HALDOL) injection 2 mg, 2 mg, IntraVENous, Q6H, Katie Ramos NP    lidocaine (XYLOCAINE) 2 % viscous solution 2.5 mL, 2.5 mL, Mouth/Throat, Q2H PRN, Tian Bains MD, 2.5 mL at 04/01/19 0946    fentaNYL (DURAGESIC) 25 mcg/hr patch 1 Patch, 1 Patch, TransDERmal, Q72H, Amanda Lopez NP, 1 Patch at 04/01/19 1312    acetaminophen (TYLENOL) suppository 650 mg, 650 mg, Rectal, Q3H PRN, Amanda Lopez NP    sodium chloride (NS) flush 10 mL, 10 mL, InterCATHeter, Q12H, Amanda Lopez NP, 10 mL at 04/01/19 0941    heparin (porcine) pf 300 Units, 300 Units, InterCATHeter, Q12H, Amanda Lopez, LAUREL, 300 Units at 04/01/19 0941    sodium chloride (NS) flush 10 mL, 10 mL, InterCATHeter, PRN, Amanda Lopez NP, 10 mL at 04/01/19 1429    heparin (porcine) pf 300 Units, 300 Units, InterCATHeter, PRN, Amanda Lopez NP, Stopped at 03/29/19 1115    LORazepam (ATIVAN) injection 1 mg, 1 mg, IntraVENous, Q4H PRN, Amanda Lopez NP, 1 mg at 04/01/19 1126    haloperidol lactate (HALDOL) injection 2 mg, 2 mg, SubCUTAneous, Q1H PRN **OR** haloperidol lactate (HALDOL) injection 2 mg, 2 mg, IntraVENous, Q1H PRN, Amanda Lopez NP, 2 mg at 04/01/19 0941    haloperidol lactate (HALDOL) injection 2 mg, 2 mg, SubCUTAneous, Q1H PRN **OR** haloperidol lactate (HALDOL) injection 2 mg, 2 mg, IntraVENous, Q1H PRN, Amanda Lopez, NP, 2 mg at 03/29/19 1920    Orders:  Orders Placed This Encounter    IP CONSULT TO SPIRITUAL CARE Once on week one, then PRN. For [facility] patients only. For contracted patients, primary hospice will continue to manage spiritual care needs     Once on week one, then PRN. For [facility] patients only. For contracted patients, primary hospice will continue to manage spiritual care needs     Standing Status:   Standing     Number of Occurrences:   1     Order Specific Question:   Reason for Consult: Answer:   Spiritual crisis intervention or per patient or caregiver request    DIET CLEAR LIQUID     Standing Status:   Standing     Number of Occurrences:   1     Order Specific Question:   Likes/Dislikes/Preferences     Answer:   may have ice cream if desired. Prefers beef broth instead of chicken broth. May have coffee with cream if he desires.  VITAL SIGNS     Standing Status:   Standing     Number of Occurrences:   1    VITAL SIGNS     Standing Status:   Standing     Number of Occurrences:   1    NURSING-MISCELLANEOUS: comfort measures Enter comfort measures above. CONTINUOUS     Enter comfort measures above. Standing Status:   Standing     Number of Occurrences:   1     Order Specific Question:   Description of Order:     Answer:   comfort measures    CELAYA CATHETER, CARE     1. Celaya Catheter care every shift and PRN  2. Notify Physician of Celaya Catheter leakage, occlusion, gross adherent sediment or accidental removal  3. Change Celaya 30 days after insertion. 4. May flush catheter bid and prn leakage or gross adherent sediment or mucus. Standing Status:   Standing     Number of Occurrences:   1    BLADDER CHECKS     May scan bladder PRN for urinary retention and or patient discomfort     Standing Status:   Standing     Number of Occurrences:   1    NURSING ASSESSMENT:  SPECIFY Assess for GIP, routine, or respite level of care.  Q SHIFT Routine     Standing Status:   Standing Number of Occurrences:   1     Order Specific Question:   Please describe the test or procedure you would like to order. Answer:   Assess for GIP, routine, or respite level of care.  PAIN ASSESSMENT Pain and Symptoms: Assess ever 4 hours and PRN, for GIP level of care. PRN Routine     Standing Status:   Standing     Number of Occurrences:   1     Order Specific Question:   Please describe the test or procedure you would like to order. Answer:   Pain and Symptoms: Assess ever 4 hours and PRN, for GIP level of care.  NURSING-MISCELLANEOUS: admit 3/28: ([facility]) Admitted GIP with metastatic adenocarcinoma of the pancreas for management of pain and nausea. Hospice diagnosis: metastatic adenocarcinoma of the pancreas. 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 .. 3/28: ([facility]) Admitted GIP with metastatic adenocarcinoma of the pancreas for management of pain and nausea. Hospice diagnosis: metastatic adenocarcinoma of the pancreas. 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 Non-associated: adenocarcinoma of the prostate survivorship. This is an order to admit Lowery Schwab to inpatient hospice care at [address] [place] [address] for a second benefit interval.  He is a 76-year-old man with a 20 month history of metastatic adenocarcinoma of the pancreas in the last 2 months his CA 19-9 antigen has risen steadily from its mj. He has developed increasing nausea and vomiting. Failure of maximum efforts to control this at home led to his hospitalization at [address] [place] [address] on March 23, 2019.   Diagnostic studies demonstrated mechanical Gastric outlet obstruction Due to tumor extending out from the pancreatic primary encasing the duodenum in the gastric antrum causing their collapse. Symptomatic relief has been obtained with IV anti-emetics and continuous low suction NG tube drainage. Patient is requiring parenteral opioid for management of his severe abdominal pain. Hepatic metastasis ,dehydration and acute renal failure, treatment related esophagitis, peripheral neuropathy, and cardiomyopathy Are significant Diagnoses related to his primary cancer which adversely affect his prognosis. Without IV fluid support this man's life expectancy is less than 10 days. He has discussed end-of-life care is oncologist, Dr. Zara Valdovinos and has chosen to forgo further efforts at extending his life. Choosing instead to limit care to comfort measures only. Prostate cancer survival is an unrelated but significant diagnoses. He will transfer to the [address] [place] [address] as soon as can be accommodated there. Standing Status:   Standing     Number of Occurrences:   1     Order Specific Question:   Description of Order:     Answer:   admit    NURSING-MISCELLANEOUS: Omit Bowel Regime Omit Bowel Regime: OMIT BOWEL REGIME: Medically Contraindicated  CONTINUOUS     Omit Bowel Regime: OMIT BOWEL REGIME: Medically Contraindicated     Standing Status:   Standing     Number of Occurrences:   1     Order Specific Question:   Description of Order:     Answer:   Omit Bowel Regime    BEDREST W/ BEDSIDE COMMODE     Standing Status:   Standing     Number of Occurrences:   1    NURSING-MISCELLANEOUS: In and out cath In and out cath [id number] prn inability to void. Patient may use own supplies from home for in and out cath due to latex allergy. CONTINUOUS     In and out cath [id number] prn inability to void. Patient may use own supplies from home for in and out cath due to latex allergy. Standing Status:   Standing     Number of Occurrences:   1     Order Specific Question:   Description of Order:     Answer:    In and out cath    DO NOT RESUSCITATE     Standing Status:   Standing Number of Occurrences:   1    OXYGEN CANNULA Liters per minute: 2; Indications for O2 therapy: RESPIRATORY DISTRESS PRN Routine     Standing Status:   Standing     Number of Occurrences:   1     Order Specific Question:   Liters per minute: Answer:   2     Order Specific Question:   Indications for O2 therapy     Answer:   RESPIRATORY DISTRESS    dexamethasone (DECADRON) 4 mg tablet     Sig: Take 4 mg by mouth daily.  SANCUSO 3.1 mg/24 hour ptwk transdermal patch     Si Patch by TransDERmal route every seven (7) days.  haloperidol (HALDOL) 1 mg tablet     Sig: Take 1 mg by mouth every six (6) hours as needed for Agitation.  phenol throat spray (CHLORASEPTIC) 1.4 % spray     Sig: Take 2 Sprays by mouth every two (2) hours as needed for Sore throat.  triamterene-hydroCHLOROthiazide (MAXZIDE) 37.5-25 mg per tablet     Sig: Take 1 Tab by mouth daily.     DISCONTD: LORazepam (ATIVAN) injection 1 mg    DISCONTD: HYDROmorphone (DILAUDID) syringe 0.5 mg    DISCONTD: HYDROmorphone (DILAUDID) syringe 0.5 mg    acetaminophen (TYLENOL) suppository 650 mg    DISCONTD: glycopyrrolate (ROBINUL) injection 0.2 mg    sodium chloride (NS) flush 10 mL    heparin (porcine) pf 300 Units    sodium chloride (NS) flush 10 mL    heparin (porcine) pf 300 Units    LORazepam (ATIVAN) injection 1 mg    OR Linked Order Group     haloperidol lactate (HALDOL) injection 2 mg     haloperidol lactate (HALDOL) injection 2 mg    OR Linked Order Group     haloperidol lactate (HALDOL) injection 2 mg     haloperidol lactate (HALDOL) injection 2 mg    DISCONTD: magic mouthwash    lidocaine (XYLOCAINE) 2 % viscous solution 2.5 mL    fentaNYL (DURAGESIC) 25 mcg/hr patch 1 Patch    DISCONTD: haloperidol lactate (HALDOL) injection 2 mg    OR Linked Order Group     HYDROmorphone (PF) (DILAUDID) injection 1 mg     HYDROmorphone (PF) (DILAUDID) injection 1 mg    glycopyrrolate (ROBINUL) injection 0.2 mg    haloperidol lactate (HALDOL) injection 2 mg    INITIAL PHYSICIAN ORDER: HOSPICE Level Of Care: General Inpatient; Reason for Admission: 3/28: ([facility]) Admitted GIP with metastatic adenocarcinoma of the pancreas for management of pain and nausea. Standing Status:   Standing     Number of Occurrences:   1     Order Specific Question:   Status     Answer:   Hospice     Order Specific Question:   Level Of Care     Answer:   General Inpatient     Order Specific Question:   Reason for Admission     Answer:   3/28: ([facility]) Admitted GIP with metastatic adenocarcinoma of the pancreas for management of pain and nausea. Order Specific Question:   Inpatient Hospitalization Certified Necessary for the Following Reasons     Answer:   3. Patient receiving treatment that can only be provided in an inpatient setting (further clarification in H&P documentation)     Order Specific Question:   Admitting Diagnosis     Answer:   Pancreatic adenocarcinoma [facility]) [[id number]]     Order Specific Question:   Terminal Prognosis Diagnosis(es)     Answer:   Pancreatic adenocarcinoma [facility]) [[id number]]     Order Specific Question:   Admitting Physician     Answer:   Tarah Palmyra     Order Specific Question:   Attending Physician     Answer:   Tarah Palmyra     Order Specific Question:   Discharge Plan:     Answer: Other (Specify)    IP CONSULT TO SOCIAL WORK     Once on week one, then PRN for Psychosocial crisis intervention or per patient or caregiver request.  For [facility] patients only. For contracted patients, primary hospice will continue to manage social work needs. Standing Status:   Standing     Number of Occurrences:   1     Order Specific Question:   Reason for Consult: Answer: Once on week one, then PRN for Psychosocial crisis intervention or per patient or caregiver request.       Allergies:   Allergies   Allergen Reactions    Latex, Natural Rubber Rash    Linaclotide Diarrhea    Diazepam Other (comments)  Jardiance [Empagliflozin] Other (comments)     Causes stomach and bladder issues    Morphine Other (comments)     Other reaction(s): Hallucinations-Intolerance    Pcn [Penicillins] Rash    Sulfa (Sulfonamide Antibiotics) Rash       Care Plan:  Multidisciplinary Problems (Active)     Problem: Anticipatory Grief     Dates: Start: 03/31/19       Description:     Disciplines: Interdisciplinary    Goal: Explore Reactions To and Verbalize Acceptance of Impending Loss     Dates: Start: 03/31/19       Description: Patient/family/caregiver will explore reactions to and verbalize acceptance of impending loss. Disciplines: Interdisciplinary    Intervention: Assess grief responses     Dates: Start: 03/31/19       Description: Assess for feelings of grief          Intervention: Instruct on grief process and coping strategies     Dates: Start: 03/31/19       Description: Instruct patient/caregiver on the grief process and effective coping strategies            Intervention: Provide grief counseling/education     Dates: Start: 03/31/19       Description: Provide patient/caregiver grief counseling and educate on community resources          Intervention: Refer to grief support in community     Dates: Start: 03/31/19       Description: Refer patient/caregiver to community resources for grief support                      Problem: Coping and Emotional Distress     Dates: Start: 03/31/19       Description:     Disciplines: Interdisciplinary    Goal: Demonstrate Acceptance of Terminal Illness and Disease Progression     Dates: Start: 03/31/19       Description: Patient/family/caregiver will demonstrate acceptance of terminal disease and understanding of disease progression while employing appropriate mechanisms.     Disciplines: Interdisciplinary    Intervention: Assess emotional status and coping mechanisms     Dates: Start: 03/31/19       Description: Assess patient/caregiver emotional status and coping mechanisms Intervention: Assess family's level of coping     Dates: Start: 03/31/19       Description:           Intervention: Instruct on effective coping strategies     Dates: Start: 03/31/19       Description: Patient/family/caregiver will demonstrate acceptance of terminal disease and understanding of disease progression while employing coping mechanisms                      Problem: Falls - Risk of     Dates: Start: 03/28/19       Description:     Disciplines: Interdisciplinary    Goal: *Absence of Falls     Dates: Start: 03/28/19   Expected End: 04/14/19       Description: Document Jose Miles Fall Risk and appropriate interventions in the flowsheet. Disciplines: Interdisciplinary                Problem: Patient Education: Go to Patient Education Activity     Dates: Start: 03/28/19       Description:     Disciplines: Interdisciplinary    Goal: Patient/Family Education     Dates: Start: 03/28/19   Expected End: 04/14/19       Description:     Disciplines: Interdisciplinary                Problem: Patient Education: Go to Patient Education Activity     Dates: Start: 03/28/19       Description:     Disciplines: Interdisciplinary    Goal: Patient/Family Education     Dates: Start: 03/28/19   Expected End: 04/14/19       Description:     Disciplines: Interdisciplinary                Problem: Pressure Injury - Risk of     Dates: Start: 03/28/19       Description:     Disciplines: Interdisciplinary    Goal: *Prevention of pressure injury     Dates: Start: 03/28/19   Expected End: 04/14/19       Description: Document Joel Scale and appropriate interventions in the flowsheet.     Disciplines: Interdisciplinary                    ___________________    Care Team Notes          POC/IDG Notes      [facility] IDG Nurse Notes by Christinaa Chaudhari RN at 04/01/19 1221  Version 1 of 1    Author:  Christiana Chaudhari RN Service:  Landry Wise Author Type:  Registered Nurse    Filed:  04/01/19 1240 Date of Service:  04/01/19 1221 Status:  Signed :  Lynnette Baca RN (Registered Nurse)       Patient: Stacy Urena    Date: 04/01/19  Time: 12:21 PM    [address] Nurse Notes  68 yo male patient with metastatic adenocarcinoma of the pancreas. General in patient for control of pain, nausea, dyspnea and agitation. Fentanyl 25 mcg. Schedule Haldol 2 mg q 8 hours. Dilaudid 0.5 mg x 5 doses for pain, Haldol 2 mg 3 doses in the last 24 hours for nausea. Medications are being administered IV via L chest port. Dilaudid is being increased to 1 mg q 30 min PRN. Pt has a bro with adequate UOP. NG tube to LIS for stomach decompression. Comfort feeding which is being extracted with NG tube. Glyco added for bladder spasms. Haldol increased to q 6 hours from q 8 hours PRN. Comprehensive plan of care reviewed. IDG and pt./family in agreement with plan of care. The IDG identifies through on-going assessment when a change is needed to the POC; the pt/family will receive care and services necessitated by changes in POC. Medications reviewed by the pharmacist and Medical Director. Signed by: Wayne Bhatt RN       [facility] IDG  Notes by Nikhil Guerra at 04/01/19 1236  Version 1 of 1    Author:  Nikhil Guerra Service:  HOSPICE Author Type:  Pastoral Care    Filed:  04/01/19 1238 Date of Service:  04/01/19 1236 Status:  Signed    :  Nikhil Guerra (Pastoral Care)       Patient: Stacy Urena    Date: 04/01/19  Time: 12:36 PM    [facility]  Notes    Patient/family decline spiritual care/support. Manuel Norm made initial spiritual assessment visit, and learned that family has good support from community of michele and for that reason declines routine spiritual care from [facility] staff.         Signed by: Fantasma Alfaro       [address] IDG  Notes by Wesley Swartz LMSW at 03/31/19 1513  Version 1 of 1    Author:  Wesley Swartz LMSW Service:  Beverly Her Author Type:      Filed:  03/31/19 1520 Date of Service:  03/31/19 1513 Status:  Signed    :  Ginny Price, [address] [address] ()       Patient: Cookie Serrato    Date: 03/31/19  Time: 3:13 PM    [facility]  Notes  SW visit with patient who is alert and oriented and his daughter Valentine Khan is at his bedside. Patient jules mcknight brought his and Valentine Khan sons to see their grandfather. The boys are [de-identified] and [de-identified] year old and Valentine Khan has explained to them what is going on and they all are coping fine. 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 Patient is and active member of [employer] and his Ryan Davila has visited. Patient wanted to come here, this is where is spouse passed away in 2011. Patient has strong michele and is coping appropriately as is his family members. SW will continue to assess ongoing and provide supportive counseling around grief and loss issues. Signed by: Giovana Foy LMSW       [facility] IDG  Notes by Rajesh Benitez at 03/29/19 1559  Version 1 of 1    Author:  Bonny KUMAR Service:  Remi Wyman Author Type:  Pastoral Care    Filed:  03/29/19 1559 Date of Service:  03/29/19 1559 Status:  Signed    :  Rajesh Benitez (Pastoral Care)       Patient: Cookie Serrato    Date: 03/29/19  Time: 3:59 PM    [facility]  Notes    Spiritual care/support to be provided as appropriate, pending assessment by  of whether patient/family accepts or declines Spiritual care/support.         Signed by: Zaina ALVARADO [facility] IDG Volunteer Notes by Oxana Willson at 03/29/19 7662  Version 1 of 1    Author:  Oxana Willson Service:  Remi Wyman Author Type:  Hospice Volunteer/    Filed:  03/29/19 2989 Date of Service:  03/29/19 1338 Status:  Signed    :  Oxana Willson (Hospice Volunteer/)           [facility] of Care Review     Status Codes I = Initiated C=Continued R=Revised RS = Resolved     I.  Volunteer Goal: Hospice house volunteer (s) enhances the quality of remaining life while patient is at the hospice house. Interventions: Carina Leonard Volunteer (s) will provide companionship to the patient and/or family by visiting at the hospice house       . Carina Leonard Volunteer (s) will provide respite as needed when requested by patient and/or family. Carina Shankar  Volunteer will provide activities such as music, reading, pet therapy, etc. as requested. Carina Shankar  Comfort bag delivered. Any other special requests or information regarding volunteer services:     No further needs identified at this time. These notes have been discussed in [facility] meeting. Signed by: Diana Damon IDG Nurse Notes by Jalen Manjarrez RN at 03/29/19 1256  Version 1 of 1    Author:  Jalen Manjarrez RN Service:  Milo Dodd Author Type:  Registered Nurse    Filed:  03/29/19 4232 Date of Service:  03/29/19 1259 Status:  Signed    :  Jalen Manjarrez RN (Registered Nurse)       Patient: Aydin Ross    Date: 03/29/19  Time: 12:54 PM    [address] Nurse Notes  67 yo male patient with pancreatic cancer who is general in patient for pain, nausea and agitation. Pt is being in-out cathed with qs. Pt has NG tube to LIS. He is taking PO for pleasure. Pt has had Haldol 4 doses in last 24 hours. Dilaudid x 5 for pain control. Medications are being administered IV. Fentanyl 25 mcg being added this IDG. Pt has a latex allergy and utilizes his own kits for I/O cath. Comprehensive plan of care reviewed. IDG and pt./family in agreement with plan of care. The IDG identifies through on-going assessment when a change is needed to the POC; the pt/family will receive care and services necessitated by changes in POC. Medications reviewed by the pharmacist and Medical Director.           Signed by: Lynann Pallas, RN       [address] IDG  Notes by Amy Carlos LMSW at 03/29/19 1254  Version 1 of 1    Author:  Luis Lopez Ramiro Vanegas LMSW Service:  Licensed Clinical  Author Type:  Licensed Masters in [facility] Ara:  03/29/19 1258 Date of Service:  03/29/19 1255 Status:  Signed    :  Darrell Sams LMSW (Licensed Masters in Social Work)       Patient: Nae Bailey    Date: 03/29/19  Time: 12:55 PM    [address] [place] [address]  Notes  No concerns reported; Continue to provide ongoing emotional support        Signed by: Janel Mcduffie LMSW

## 2019-04-01 NOTE — PROGRESS NOTES
Progress Note    Patient: Hiren Morris MRN: [id number]  SSN: [id number]    YOB: 1945  Age: 68 y.o. Sex: male      Admit Date: 3/28/2019    LOS: 4 days     Subjective:     Drowsy, but easily arousable. Taking in sips. NG to LIS. No vomiting but has persistent nausea. Taking all medications via IV due to gastric outlet obstruction. Please see MAR. Review of Systems:  C/o persistent nausea. Denies dyspnea. C/o abdominal pain. Objective:     Vitals:    03/29/19 1625 03/30/19 0526 03/31/19 0636 04/01/19 0550   BP: 106/57 110/63 111/59 131/69   Pulse: 87 80 86 89   Resp: 14 16 18 18   Temp: 96.9 °F (36.1 °C) 96.6 °F (35.9 °C)  97.2 °F (36.2 °C)        Intake and Output:  Current Shift: No intake/output data recorded. Last three shifts: 03/31 0701 - 04/01 1900  In: -   Out: 200 [Urine:200]    Physical Exam:   GENERAL: fatigued, cooperative, mild distress, appears stated age, cachectic  LUNG: Coarse breath sounds with crackles in the bases. Unlabored respirations  HEART: regular rate and rhythm  ABDOMEN: soft. Bowel sounds hypoactive. Tenderness in upper quadrant of the abdomen. NG tube to LIS. : Self cath prn. Continent. EXTREMITIES:  extremities normal, atraumatic, no cyanosis or edema. + pulses. SKIN: Normal. Warm to touch. Right mediport accessed for medication administration. NEUROLOGIC: Drowsy, Ox3. Reflexes and motor strength symmetric. Cranial nerves 2-12 and sensation grossly intact. Able to answer questions appropriately. PSYCHIATRIC: anxious    Lab/Data Review:  No new labs resulted in the last 24 hours.     Assessment:     Principal Problem:    Pancreatic adenocarcinoma ([address] (3/28/2019)      Overview: Metastatic     Active Problems:    Malignant neoplasm of head of pancreas ([address] (5/25/2017)             Metastatic adenocarcinoma ([address] (3/28/2019)      Overview: Duodenum and gastric antrum      Hepatic metastasis ([address] (3/28/2019)      Complete gastric outlet obstruction (4/1/2019)        Plan:     Current Facility-Administered Medications   Medication Dose Route Frequency    HYDROmorphone (PF) (DILAUDID) injection 1 mg  1 mg SubCUTAneous Q30MIN PRN    Or    HYDROmorphone (PF) (DILAUDID) injection 1 mg  1 mg IntraVENous Q30MIN PRN    glycopyrrolate (ROBINUL) injection 0.2 mg  0.2 mg IntraVENous Q4H PRN    haloperidol lactate (HALDOL) injection 2 mg  2 mg IntraVENous Q6H    lidocaine (XYLOCAINE) 2 % viscous solution 2.5 mL  2.5 mL Mouth/Throat Q2H PRN    fentaNYL (DURAGESIC) 25 mcg/hr patch 1 Patch  1 Patch TransDERmal Q72H    acetaminophen (TYLENOL) suppository 650 mg  650 mg Rectal Q3H PRN    sodium chloride (NS) flush 10 mL  10 mL InterCATHeter Q12H    heparin (porcine) pf 300 Units  300 Units InterCATHeter Q12H    sodium chloride (NS) flush 10 mL  10 mL InterCATHeter PRN    heparin (porcine) pf 300 Units  300 Units InterCATHeter PRN    LORazepam (ATIVAN) injection 1 mg  1 mg IntraVENous Q4H PRN    haloperidol lactate (HALDOL) injection 2 mg  2 mg SubCUTAneous Q1H PRN    Or    haloperidol lactate (HALDOL) injection 2 mg  2 mg IntraVENous Q1H PRN    haloperidol lactate (HALDOL) injection 2 mg  2 mg SubCUTAneous Q1H PRN    Or    haloperidol lactate (HALDOL) injection 2 mg  2 mg IntraVENous Q1H PRN       Admitted GIP with metastatic pancreatic adenocarcinoma for management of pain, nausea and agitation. 1. Pain: Hydromorphone as ordered. Fentanyl patch as ordered. 2. Nausea: Haloperidol as ordered. Diet as tolerated. NG to LIS. 3. Agitation: Haloperidol and Lorazepam as ordered. 4. Family/Pt Support: Family at bedside during exam. Medications and plan of care discussed with nursing staff and family. Will continue to monitor for symptoms and adjust medications as needed to maintain patient comfort. PPS 20%. Case discussed with Dr. Corona Nice.       Signed By: Juan Ramon Olivier NP     April 1, 2019

## 2019-04-01 NOTE — [facility] IDG SOCIAL WORKER NOTES
Patient: Matthew Puentes    Date: 04/01/19  Time: 12:22 PM    [facility]  Notes    Continue to offer ongoing emotional support support and make appropriate referrals.       Signed by: JOSUÉ Morocho

## 2019-04-01 NOTE — PROGRESS NOTES
Progress Note     Patient: Oxana Young MRN: [id number]  SSN: [id number]    YOB: 1945  Age: 68 y.o. Sex: male       Admit Date: 3/28/2019    LOS: 4 days      Subjective:      Seen without family at bedside. States that his abdominal pain is worse and he is nauseous, but is able to take sips of coffee and bites of jello. States that he is not sleeping well, although, per nursing, has been noted to dose off during the day. Per nursing, he has begun experiencing episodes of confusion and has requested no visitors. Afebrile, normotensive. Per MAR, has required Haldol x3, Dilaudid x5, and Ativan x1 over the past 24 hours. Hawley was placed last night.     Review of Systems:  Endorses nausea and pain in his abdomen. Denies fevers or other discomfort     Objective:             Vitals:     03/29/19 1625 03/30/19 0526 03/31/19 0636 04/01/19 0550   BP: 106/57 110/63 111/59 131/69   Pulse: 87 80 86 89   Resp: 14 16 18 18   Temp: 96.9 °F (36.1 °C) 96.6 °F (35.9 °C)   97.2 °F (36.2 °C)         Intake and Output:  Current Shift: No intake/output data recorded. Last three shifts: No intake/output data recorded.     Physical Exam:   GENERAL: alert, cooperative, mild distress, appears stated age, NG tube intact on low intermittent suction  LUNG: diminished lung sounds, crackles in bases bilaterally  HEART: regular rate and rhythm  ABDOMEN: soft, epigastric and RUQ tenderness. Bowel sounds decreased.  No masses felt,  no organomegaly  EXTREMITIES:  extremities normal, atraumatic, no cyanosis or edema, distal pulses 2+ bilaterally  SKIN: Normal. and no rash or abnormalities  NEUROLOGIC: A&O x3, PEERLA, moves all extremities, answers questions appopriately     Hawley intact without kinks draining dark brownish/yellow urine     Assessment:      Principal Problem:    Pancreatic adenocarcinoma ([address] (3/28/2019)      Overview: Metastatic      Active Problems:    Malignant neoplasm of head of pancreas ([address] (5/25/2017) Overview:      5-14-18  Not resectable when re-evaluated 4-19.         Metastatic adenocarcinoma ([facility] (3/28/2019)      Overview: Duodenum and gastric antrum       Hepatic metastasis ([facility] (3/28/2019)     Plan:      Current Facility-Administered Medications   Medication Dose Route Frequency    haloperidol lactate (HALDOL) injection 2 mg  2 mg IntraVENous Q8H    lidocaine (XYLOCAINE) 2 % viscous solution 2.5 mL  2.5 mL Mouth/Throat Q2H PRN    fentaNYL (DURAGESIC) 25 mcg/hr patch 1 Patch  1 Patch TransDERmal Q72H    HYDROmorphone (DILAUDID) syringe 0.5 mg  0.5 mg SubCUTAneous Q30MIN PRN     Or    HYDROmorphone (DILAUDID) syringe 0.5 mg  0.5 mg IntraVENous Q30MIN PRN    acetaminophen (TYLENOL) suppository 650 mg  650 mg Rectal Q3H PRN    glycopyrrolate (ROBINUL) injection 0.2 mg  0.2 mg IntraVENous Q4H PRN    sodium chloride (NS) flush 10 mL  10 mL InterCATHeter Q12H    heparin (porcine) pf 300 Units  300 Units InterCATHeter Q12H    sodium chloride (NS) flush 10 mL  10 mL InterCATHeter PRN    heparin (porcine) pf 300 Units  300 Units InterCATHeter PRN    LORazepam (ATIVAN) injection 1 mg  1 mg IntraVENous Q4H PRN    haloperidol lactate (HALDOL) injection 2 mg  2 mg SubCUTAneous Q1H PRN     Or    haloperidol lactate (HALDOL) injection 2 mg  2 mg IntraVENous Q1H PRN    haloperidol lactate (HALDOL) injection 2 mg  2 mg SubCUTAneous Q1H PRN     Or    haloperidol lactate (HALDOL) injection 2 mg  2 mg IntraVENous Q1H PRN      Mr. Feliciana Spatz is a pleasant 66yo gentleman admitted 3/28 to [facility] as a GIP with a primary diagnosis of metastatic pancreatic adenocarcinoma. We will manage his pain, N/V, and anxiety and provide family/patient support during his admission here. He is on a clear liquid diet and prefers coffee with cream, ice cream, and beef broth.     1. Gastric outlet obstruction secondary to recurrence of pancreatic cancer 2 years post resection.  His NG tube is allowing him the pleasure of sipping coffee and eating ice cream while reducing his nausea.     2.  Nausea: Currenty receiving Haldol 2mg IV Q8H scheduled. We will increase this to Haldol 2mg IV Q1H scheduled due to his use of several PRNs over the past 24 hours. We also have available Haldol 2mg IV Q1H PRN for breakthrough nausea and Ativan 1mg IV Q4H PRN if the Haldol is ineffective.     3.  Pain: He is currently on a fentanyl 25mcg patch Q72H. Although he is also requiring increased PRN medications, they appear to be clustered together. Rather than increasing his fentanyl patch, we will increase his PRN Dilaudid from 0.5mg to 1mg  IV Q30MIN for breakthrough pain.     4.  Urinary retention status post radical prostatectomy: The patient was unable to self cath last night and agreed to have a bro placed. This was placed last night. He has previously had bladder spasms with bro catheters, so we will prescribe him glycopyrrolate 0.25mg IV Q4H PRN for any bladder spasms.     5. Family/Pt Support: The patient has had many visitors and his daughter remains at his bedside for several hours each day. We will continue to offer support to the patient and his family and friends until he passes.     PPS 20%   The patient required repeated IV bolus dose of hydromorphone 0.5 mg ×3 this morning to gain control of an exacerbation of his generalized pain. We will increase bolus dose to 1 mg as demanded by his  Reluctance to use analgesia in a timely fashion.   Nausea and vomiting is under excellent control with scheduled haloperidol. Patient is now largely disinterested in taking fluids other than coffee by. His abdomen is remarkably scaphoid. Despite stable broad pressure, I feel this patient's life expectancy is now less than 72 hours. Signed By: Madhavi Whipple      April 1, 2019     I have examined this patient and have modified the medical student's progress note above to reflect my own observations.   Cookie Garcia M.D.  Asael Pierce by: Roselyn Virk Eva Dorado MD at 04/01/19 3919

## 2019-04-02 NOTE — PROGRESS NOTES
Progress Note    Patient: Tomasa Norman MRN: [id number]  SSN: [id number]    YOB: 1945  Age: 68 y.o. Sex: male      Admit Date: 3/28/2019    LOS: 5 days     Subjective:     Drowsy, but easily arousable. Taking in sips. NG to LIS. No vomiting. Persistent nausea and c/o abdominal pain. Taking all medications via IV due to gastric outlet obstruction. Please see MAR. Daughter at bedside. Review of Systems:  C/o persistent nausea. Denies dyspnea. C/o abdominal pain. Objective:     Vitals:    03/30/19 0526 03/31/19 0636 04/01/19 0550 04/02/19 0447   BP: 110/63 111/59 131/69 142/64   Pulse: 80 86 89 100   Resp: 16 18 18 16   Temp: 96.6 °F (35.9 °C)  97.2 °F (36.2 °C) 97.8 °F (36.6 °C)        Intake and Output:  Current Shift: No intake/output data recorded. Last three shifts: 03/31 1901 - 04/02 0700  In: -   Out: 500 [Urine:500]    Physical Exam:   GENERAL: fatigued, cooperative, mild distress, appears stated age, cachectic  LUNG: Coarse breath sounds with crackles in the bases. Unlabored respirations  HEART: regular rate and rhythm  ABDOMEN: soft. Bowel sounds hypoactive. Tenderness in upper quadrant of the abdomen. NG tube to LIS. : Hawley catheter with nirmal urine. EXTREMITIES:  extremities normal, atraumatic, no cyanosis or edema. + pulses. SKIN: Normal. Warm to touch. Right mediport accessed for medication administration. NEUROLOGIC: Drowsy, Ox3. Reflexes and motor strength symmetric. Cranial nerves 2-12 and sensation grossly intact. Able to answer questions appropriately but slow to respond at times. Lab/Data Review:  No new labs resulted in the last 24 hours.     Assessment:     Principal Problem:    Pancreatic adenocarcinoma ([address] (3/28/2019)      Overview: Metastatic     Active Problems:    Malignant neoplasm of head of pancreas ([address] (5/25/2017)             Metastatic adenocarcinoma ([address] (3/28/2019)      Overview: Duodenum and gastric antrum      Hepatic metastasis ([facility] (3/28/2019)      Complete gastric outlet obstruction (4/1/2019)        Plan:     Current Facility-Administered Medications   Medication Dose Route Frequency    fentaNYL (DURAGESIC) 50 mcg/hr patch 1 Patch  1 Patch TransDERmal Q72H    HYDROmorphone (PF) (DILAUDID) injection 1 mg  1 mg SubCUTAneous Q30MIN PRN    Or    HYDROmorphone (PF) (DILAUDID) injection 1 mg  1 mg IntraVENous Q30MIN PRN    glycopyrrolate (ROBINUL) injection 0.2 mg  0.2 mg IntraVENous Q4H PRN    haloperidol lactate (HALDOL) injection 2 mg  2 mg IntraVENous Q6H    lidocaine (XYLOCAINE) 2 % viscous solution 2.5 mL  2.5 mL Mouth/Throat Q2H PRN    acetaminophen (TYLENOL) suppository 650 mg  650 mg Rectal Q3H PRN    sodium chloride (NS) flush 10 mL  10 mL InterCATHeter Q12H    heparin (porcine) pf 300 Units  300 Units InterCATHeter Q12H    sodium chloride (NS) flush 10 mL  10 mL InterCATHeter PRN    heparin (porcine) pf 300 Units  300 Units InterCATHeter PRN    LORazepam (ATIVAN) injection 1 mg  1 mg IntraVENous Q4H PRN    haloperidol lactate (HALDOL) injection 2 mg  2 mg SubCUTAneous Q1H PRN    Or    haloperidol lactate (HALDOL) injection 2 mg  2 mg IntraVENous Q1H PRN    haloperidol lactate (HALDOL) injection 2 mg  2 mg SubCUTAneous Q1H PRN    Or    haloperidol lactate (HALDOL) injection 2 mg  2 mg IntraVENous Q1H PRN       Admitted GIP with metastatic pancreatic adenocarcinoma for management of pain, nausea and agitation. 1. Pain: Hydromorphone as ordered. Fentanyl patch as ordered. 2. Nausea: Haloperidol as ordered. Diet as tolerated. NG to LIS. 3. Agitation: Haloperidol and Lorazepam as ordered. 4. Family/Pt Support: Family at bedside during exam. Medications and plan of care discussed with nursing staff and family. Will continue to monitor for symptoms and adjust medications as needed to maintain patient comfort. PPS 20%. Case discussed with Dr. Leslie Mohamud.  Increase fentanyl patch to 50mcg and increase haldol to q 6 for nausea. Case discussed with his daughter Tiffani Bermudez who is a NP. She is returning home to [place] and has asked to receive daily updates from the provider until she returns.        Signed By: Ekaterina Hastings NP     April 2, 2019

## 2019-04-02 NOTE — HOSPICE
FYI:      Note completed by Jason Richards in IDG section should have been in the notes section.     Armani Self

## 2019-04-02 NOTE — PROGRESS NOTES
0700 Report received from [facility]. Patient identified by name and . Patient resting quietly in bed, wakes to voice. Fentanyl patch confirmed to right arm. No signs or symptoms of distress noted at present. Bed in low and locked position, side rails up x2, call bell within reach, tab alarm in place. No family present at bedside. Door open for continuous monitoring. 4255 patient reports pain and nausea, medicated with dilaudid and haldol. 1018 Dilaudid IV for pain. Patient's daughter is here, she will be going back to [place] today for a few days and is concerned that [facility] won't call and ask for pain medicine. \"  She feels he has a lot more pain than he reports. He will report pain when asked if he is hurting but \"doesn't want to bother anyone\" by calling. RN reassured her RN will make regular rounds and ask patient about his pain and nausea. Fentanyl patch increased from 25 mcg to 50 mcg today. 1250 dilaudid and haldol IV for pain and nausea. 1436 dilaudid and haldol IV for pain and nausea. 1712 dilaudid and haldol IV for pain and nausea. 1749 dilaudid for continued pain, patient rates 5/10. Viscous lidocaine given for sore throat.

## 2019-04-02 NOTE — PROGRESS NOTES
Report received. Patient resting quietly at this time . Awakens when this RN walks in. No s/sx of pain or distress at this time. Bed low and locked and call light within reach. Visitor at bedside. Door left open for continuous monitoring. 0436 end of shift note-    Patient received bath last night and insisted on wearing own clothes. Emphasized that wearing pants may make wound on sacrum worse. Patient states he wants to wear his clothes because he gets cold. Also noticed patient coughing after drinking thin liquids. Suggested thickened liquids and explained aspiration to him. He denied liquids and said that \"it hasn't happened yet\" referring to water in lungs. Patient rested comfortably this shift with minimal prns. NG tube remains intact; canister changed around midnight.

## 2019-04-03 NOTE — PROGRESS NOTES
1900 Report received, patient resting quietly in bed. Eyes closed and face relaxed. ROYAL in room at this time. Bed lown and locked, tab alert in place and call light within reach, sr up and door left open for continuous monitoring. Summary   Patient starting to get confused. Asked this RN when he was brought back to \"this room. \" Required several prns this shift, rested well in between. Pt is nauseated and in pain when awake. R port dsg changed, remains intact. Patient found this am messing with NG tube saying \" You're going to have to do it. I can't find it. \" When asked what he was trying to find, patient stated, \"the plunger. \"

## 2019-04-03 NOTE — PROGRESS NOTES
0700 Report received from [address] [place] [address]. Patient identified by name and . Patient sleeping quietly in bed. Fentanyl patch in place to left arm. No signs or symptoms of distress noted at present. Bed in low and locked position, side rails up x2, call bell within reach, tab alarm in place. No family present at bedside. Door open for continuous monitoring.

## 2019-04-03 NOTE — PROGRESS NOTES
OZZY visited with the pt briefly in his room. He had a visitor so I kept my visit brief.     Pt is not interested in a referral to  at this time

## 2019-04-03 NOTE — PROGRESS NOTES
Progress Note    Patient: Cary Collazo MRN: [id number]  SSN: [id number]    YOB: 1945  Age: 68 y.o. Sex: male      Admit Date: 3/28/2019    LOS: 6 days     Subjective:     Unresponsive. NPO. NG to LIS. Has required dilaudid x 9 doses for pain/dyspnea. Having agonal breathing with moaning. Daughter at bedside. Review of Systems:  Review of systems not obtained due to patient factors. Objective:     Vitals:    03/31/19 0636 04/01/19 0550 04/02/19 0447 04/03/19 0505   BP: 111/59 131/69 142/64 93/53   Pulse: 86 89 100 (!) 117   Resp: 18 18 16 16   Temp:  97.2 °F (36.2 °C) 97.8 °F (36.6 °C) 99.5 °F (37.5 °C)        Intake and Output:  Current Shift: No intake/output data recorded. Last three shifts: 04/01 1901 - 04/03 0700  In: -   Out: 1500 [Urine:700]    Physical Exam:   GENERAL: moderate distress, appears stated age, cachectic, unresponsive to stimuli but continues to moan  LUNG: Coarse breath sounds with rhonchi in the bases. Labored and shallow respirations  HEART: regular rate and rhythm  ABDOMEN: soft. Bowel sounds hypoactive. NG tube to LIS. : Hawley catheter with nirmal urine. EXTREMITIES:  extremities with no cyanosis or edema. + pulses. SKIN: Warm to touch. Right mediport accessed for medication administration. New wound to sacrum, DTI with unstageable open area in the middle. NEUROLOGIC: Unresponsive to stimuli. Continuously moaning with each breath. Unable to participate in exam.     Lab/Data Review:  No new labs resulted in the last 24 hours.     Assessment:     Principal Problem:    Pancreatic adenocarcinoma ([address] (3/28/2019)      Overview: Metastatic     Active Problems:    Malignant neoplasm of head of pancreas ([address] (5/25/2017)             Metastatic adenocarcinoma ([address] (3/28/2019)      Overview: Duodenum and gastric antrum      Hepatic metastasis ([address] (3/28/2019)      Complete gastric outlet obstruction (4/1/2019)        Plan:     Current Facility-Administered Medications   Medication Dose Route Frequency    HYDROmorphone (PF) (DILAUDID) injection 2 mg  2 mg SubCUTAneous Q30MIN PRN    Or    HYDROmorphone (PF) (DILAUDID) injection 2 mg  2 mg IntraVENous Q30MIN PRN    fentaNYL (DURAGESIC) 50 mcg/hr patch 1 Patch  1 Patch TransDERmal Q72H    glycopyrrolate (ROBINUL) injection 0.2 mg  0.2 mg IntraVENous Q4H PRN    haloperidol lactate (HALDOL) injection 2 mg  2 mg IntraVENous Q6H    lidocaine (XYLOCAINE) 2 % viscous solution 2.5 mL  2.5 mL Mouth/Throat Q2H PRN    acetaminophen (TYLENOL) suppository 650 mg  650 mg Rectal Q3H PRN    sodium chloride (NS) flush 10 mL  10 mL InterCATHeter Q12H    heparin (porcine) pf 300 Units  300 Units InterCATHeter Q12H    sodium chloride (NS) flush 10 mL  10 mL InterCATHeter PRN    heparin (porcine) pf 300 Units  300 Units InterCATHeter PRN    LORazepam (ATIVAN) injection 1 mg  1 mg IntraVENous Q4H PRN    haloperidol lactate (HALDOL) injection 2 mg  2 mg SubCUTAneous Q1H PRN    Or    haloperidol lactate (HALDOL) injection 2 mg  2 mg IntraVENous Q1H PRN    haloperidol lactate (HALDOL) injection 2 mg  2 mg SubCUTAneous Q1H PRN    Or    haloperidol lactate (HALDOL) injection 2 mg  2 mg IntraVENous Q1H PRN       Admitted GIP with metastatic pancreatic adenocarcinoma for management of pain, nausea and agitation. 1. Pain: Hydromorphone as ordered. Fentanyl patch as ordered. 2. Nausea: Haloperidol as ordered. NG to LIS. 3. Agitation: Haloperidol and Lorazepam as ordered. 4. Family/Pt Support: Family at bedside during exam. Medications and plan of care discussed with nursing staff and family. Will continue to monitor for symptoms and adjust medications as needed to maintain patient comfort. PPS 10%. Case discussed with Dr. Roselyn Virk. Patient looks active and is having agonal respirations. Will increase dilaudid to 2mg prn for respiratory distress.      Signed By: Diana Heredia NP     April 3, 2019

## 2019-04-03 NOTE — PROGRESS NOTES
Time of death 18  Pt with no apical pulse, no blood pressure, no respirations, and  unresponsive to verbal or tactile stimuli. Daughter present at bedside at time of death.

## (undated) DEVICE — BLLN KT O RING ENDOSCP US --

## (undated) DEVICE — ENDOSCOPIC ULTRASOUND FINE NEEDLE BIOPSY (FNB) DEVICE: Brand: ACQUIRE

## (undated) DEVICE — MOUTHPIECE ENDOSCP 20X27MM --